# Patient Record
Sex: MALE | Race: WHITE | Employment: OTHER | URBAN - METROPOLITAN AREA
[De-identification: names, ages, dates, MRNs, and addresses within clinical notes are randomized per-mention and may not be internally consistent; named-entity substitution may affect disease eponyms.]

---

## 2018-05-24 LAB
CREATININE, EXTERNAL: 0.94
HBA1C MFR BLD HPLC: 8.4 %
LDL-C, EXTERNAL: 67

## 2018-06-22 ENCOUNTER — OFFICE VISIT (OUTPATIENT)
Dept: ENDOCRINOLOGY | Age: 73
End: 2018-06-22

## 2018-06-22 VITALS
OXYGEN SATURATION: 98 % | RESPIRATION RATE: 14 BRPM | HEART RATE: 64 BPM | WEIGHT: 117.2 LBS | SYSTOLIC BLOOD PRESSURE: 131 MMHG | DIASTOLIC BLOOD PRESSURE: 59 MMHG | HEIGHT: 64 IN | BODY MASS INDEX: 20.01 KG/M2 | TEMPERATURE: 96.4 F

## 2018-06-22 DIAGNOSIS — E78.2 MIXED HYPERLIPIDEMIA: ICD-10-CM

## 2018-06-22 DIAGNOSIS — I10 ESSENTIAL HYPERTENSION: ICD-10-CM

## 2018-06-22 DIAGNOSIS — E11.65 TYPE 2 DIABETES MELLITUS WITH HYPERGLYCEMIA, WITHOUT LONG-TERM CURRENT USE OF INSULIN (HCC): Primary | ICD-10-CM

## 2018-06-22 DIAGNOSIS — E83.52 HYPERCALCEMIA: ICD-10-CM

## 2018-06-22 RX ORDER — AMLODIPINE BESYLATE 5 MG/1
10 TABLET ORAL
COMMUNITY
End: 2021-11-11

## 2018-06-22 RX ORDER — LOSARTAN POTASSIUM AND HYDROCHLOROTHIAZIDE 25; 100 MG/1; MG/1
1 TABLET ORAL DAILY
COMMUNITY
End: 2019-07-24 | Stop reason: ALTCHOICE

## 2018-06-22 RX ORDER — METFORMIN HYDROCHLORIDE 500 MG/1
1000 TABLET, EXTENDED RELEASE ORAL 2 TIMES DAILY
COMMUNITY

## 2018-06-22 RX ORDER — ATORVASTATIN CALCIUM 10 MG/1
TABLET, FILM COATED ORAL
COMMUNITY

## 2018-06-22 RX ORDER — METOPROLOL SUCCINATE 25 MG/1
25 TABLET, EXTENDED RELEASE ORAL DAILY
COMMUNITY
End: 2020-06-19

## 2018-06-22 RX ORDER — ASPIRIN 81 MG/1
TABLET ORAL DAILY
COMMUNITY
End: 2019-11-04

## 2018-06-22 NOTE — PROGRESS NOTES
[facility]               Kenneth Teran MD        [address] 78 [phone] Fax [phone]               Patient Information  Date:6/24/2018  Name : Malcolm Leroy 75 y.o.     YOB: 1945         Referred by: Dimitri Riedel, MD         Chief Complaint   Patient presents with    New Patient     Diabetes       History of Present Illness: Malcolm Leroy is a 75 y.o. male here for initial visit of  Type 2 Diabetes Mellitus. He has long-standing history of type 2 diabetes mellitus, at least 20 years ago, on oral medications. He was on insulin several months ago which he has discontinued as he did not like. Last A1c was 8.5, he is on metformin and Januvia. Fastings are ranging from 140-160, no regular exercise  Trying to change the diet. No weight gain  He also has hypercalcemia with calcium more than 11, PTH nonsuppressed, DEXA scan showed osteopenia, no nephrolithiasis, no CKD,PUD  No polyuria, polydipsia, hypoglycemia, chest pain, shortness of breath  No tingling or numbness in the feet    Planning to resume exercise    Wt Readings from Last 3 Encounters:   06/22/18 117 lb 3.2 oz (53.2 kg)       BP Readings from Last 3 Encounters:   06/22/18 131/59           Past Medical History:   Diagnosis Date    DM (diabetes mellitus) ([address]     HTN (hypertension)     Hypercalcemia     Hyperlipidemia     Vitamin D deficiency      Current Outpatient Prescriptions   Medication Sig    losartan-hydroCHLOROthiazide (HYZAAR) 100-25 mg per tablet Take 1 Tab by mouth daily.  metoprolol succinate (TOPROL-XL) 25 mg XL tablet Take  by mouth daily.  metFORMIN ER (GLUCOPHAGE XR) 500 mg tablet Take 1,000 mg by mouth two (2) times a day.  atorvastatin (LIPITOR) 10 mg tablet Take  by mouth daily.  amLODIPine (NORVASC) 5 mg tablet Take 5 mg by mouth daily.  aspirin delayed-release 81 mg tablet Take  by mouth daily.     SITagliptin (Mardella Early) 100 mg tablet Take 100 mg by mouth daily. No current facility-administered medications for this visit. Allergies   Allergen Reactions    Aspirin Nausea Only       Review of Systems:  All systems reviewed and are negative other than mentioned in HPI    Physical Examination:   Blood pressure 131/59, pulse 64, temperature 96.4 °F (35.8 °C), temperature source Oral, resp. rate 14, height 5' 4\" (1.626 m), weight 117 lb 3.2 oz (53.2 kg), SpO2 98 %. Estimated body mass index is 20.12 kg/(m^2) as calculated from the following:    Height as of this encounter: 5' 4\" (1.626 m). -   Weight as of this encounter: 117 lb 3.2 oz (53.2 kg). - General: pleasant, no distress, good eye contact  - HEENT: no pallor, no periorbital edema, EOMI  - Neck: supple, no thyromegaly, no nodules  - Cardiovascular: regular,  normal S1 and S2, no murmurs  - Respiratory: clear to auscultation bilaterally  - Gastrointestinal: soft, nontender, nondistended,  BS +  - Musculoskeletal: no proximal muscle weakness in upper or lower extremities  - Integumentary: no acanthosis nigricans,no edema,   - Neurological: alert and oriented  - Psychiatric: normal mood and affect  - Skin: color, texture, turgor normal.     Diabetic foot exam: June 2019    Left Foot:   Visual Exam: normal    Pulse DP: 2+ (normal)   Filament test: reduced sensation          Right Foot:   Visual Exam: normal    Pulse DP: 2+ (normal)   Filament test: reduced sensation        Data Reviewed:     [] Glucose records reviewed. [] See glucose records for details (to be scanned). [] A1C  [] Reviewed labs    DEXA scan May 2018  Lumbar spine T score -1.2  Femoral neck T score -2.3  Right femoral neck T score -2.3  FRAX score major osteoporotic fractures is 5%, hip fracture 1.9%  No vertebral fractures reported    Assessment/Plan:     1. Type 2 diabetes mellitus with hyperglycemia, without long-term current use of insulin (HCC)        1.  Type 2 Diabetes Mellitus   No results found for: HBA1C, HGBE8, VLQ1WVPN, VAP6VZCA, BNY9BTVC  Uncontrolled  Discussed about decreasing pancreatic reserve over time, unless he is on very low carbohydrate diet and it is very difficult for him to control blood glucose, also stressed the importance of exercise  Continue metformin and Januvia  At this time he does not want any additional medications, he wants to try lifestyle changes, diet plan given    Diabetic issues reviewed : glycemic goals , written exchange diet given, low carbohydrate diet, weight control , home glucose monitoring emphasized,  hypoglycemia management and long term diabetic complications discussed. FLU annually ,Pneumovax ,aspirin daily,annual eye exam,microalbumin    2. HTN : Continue current therapy     3. Hyperlipidemia : Continue statin. 5.  Hypercalcemia: Nonsuppressed PTH    Primary hyperparathyroidism versus hydrochlorothiazide induced hypercalcemia  Need to discontinue hydrochlorothiazide and recheck labs in 6 weeks, hypercalcemia could be due to a combination of primary hyperparathyroidism and hydrochlorothiazide induced hypercalcemia, hydrochlorothiazide uncovers primary hyperparathyroidism. Wishes to consult PCP about discontinuing hydrochlorothiazide as he has been on it for a long time . No known osteoporosis, nephrolithiasis  There is a he can continue dairy intake  and there is no need for restriction   Hydration discussed      There are no Patient Instructions on file for this visit. Follow-up Disposition:  Return in about 4 months (around 10/22/2018). Thank you for allowing me to participate in the care of this patient. Briana Echols MD      Patient verbalized understanding     Voice-recognition software was used to generate this report, which may result in some phonetic-based errors in the grammar and contents. Even though attempts were made to correct all the mistakes, some may have been missed and remained in the body of the report.

## 2018-06-22 NOTE — MR AVS SNAPSHOT
[address] [address] 
[phone] Patient: Evi Ryan MRN: [id number] KKK:[id number] Visit Information Date & Time Provider Department Dept. Phone Encounter #  
 6/22/2018  9:00 AM Breanne Morales MD [facility] Diabetes & Endocrinology [phone] [id number] Follow-up Instructions Return in about 4 months (around 10/22/2018). Upcoming Health Maintenance Date Due DTaP/Tdap/Td series (1 - Tdap) 6/1/1965 FOBT Q 1 YEAR AGE 50-75 6/1/1994 ZOSTER VACCINE AGE 60> 4/1/2004 GLAUCOMA SCREENING Q2Y 6/1/2009 Pneumococcal 65+ Low/Medium Risk (1 of 2 - PCV13) 6/1/2009 Influenza Age 5 to Adult 8/1/2018 Allergies as of 6/22/2018  Review Complete On: 6/22/2018 By: Breanne Morales MD  
  
 Severity Noted Reaction Type Reactions Aspirin  06/22/2018    Nausea Only Current Immunizations  Never Reviewed No immunizations on file. Not reviewed this visit You Were Diagnosed With   
  
 Codes Comments Type 2 diabetes mellitus with hyperglycemia, without long-term current use of insulin (HCC)    -  Primary ICD-10-CM: E11.65 ICD-9-CM: 250.00, 790.29 Vitals BP Pulse Temp Resp Height(growth percentile) Weight(growth percentile) 131/59 (BP 1 Location: Left arm, BP Patient Position: Sitting) 64 96.4 °F (35.8 °C) (Oral) 14 5' 4\" (1.626 m) 117 lb 3.2 oz (53.2 kg) SpO2 BMI Smoking Status 98% 20.12 kg/m2 Never Smoker Vitals History BMI and BSA Data Body Mass Index Body Surface Area  
 20.12 kg/m 2 1.55 m 2 Your Updated Medication List  
  
   
This list is accurate as of 6/22/18  9:57 AM.  Always use your most recent med list. amLODIPine 5 mg tablet Commonly known as:  Caralee Dupes Take 5 mg by mouth daily. aspirin delayed-release 81 mg tablet Take  by mouth daily. atorvastatin 10 mg tablet Commonly known as:  LIPITOR Take  by mouth daily. JANUVIA 100 mg tablet Generic drug:  SITagliptin Take 100 mg by mouth daily. losartan-hydroCHLOROthiazide 100-25 mg per tablet Commonly known as:  HYZAAR Take 1 Tab by mouth daily. metFORMIN  mg tablet Commonly known as:  GLUCOPHAGE XR Take 1,000 mg by mouth two (2) times a day. metoprolol succinate 25 mg XL tablet Commonly known as:  TOPROL-XL Take  by mouth daily. Follow-up Instructions Return in about 4 months (around 10/22/2018). Introducing \[facility]\"" & HEALTH SERVICES! Ana Lui introduces [facility] patient portal. Now you can access parts of your medical record, email your doctor's office, and request medication refills online. 1. In your internet browser, go to [email or web address] 2. Click on the First Time User? Click Here link in the Sign In box. You will see the New Member Sign Up page. 3. Enter your [facility] Access Code exactly as it appears below. You will not need to use this code after youve completed the sign-up process. If you do not sign up before the expiration date, you must request a new code. · [facility] Access Code: [email or web address] Expires: 9/20/2018  9:12 AM 
 
4. Enter the last four digits of your Social Security Number (xxxx) and Date of Birth (mm/dd/yyyy) as indicated and click Submit. You will be taken to the next sign-up page. 5. Create a [facility] ID. This will be your [facility] login ID and cannot be changed, so think of one that is secure and easy to remember. 6. Create a [facility] password. You can change your password at any time. 7. Enter your Password Reset Question and Answer. This can be used at a later time if you forget your password. 8. Enter your e-mail address. You will receive e-mail notification when new information is available in [address] 9. Click Sign Up. You can now view and download portions of your medical record. 10. Click the Download Summary menu link to download a portable copy of your medical information. If you have questions, please visit the Frequently Asked Questions section of the [employer] website. Remember, [employer] is NOT to be used for urgent needs. For medical emergencies, dial 911. Now available from your iPhone and Android! Please provide this summary of care documentation to your next provider. Your primary care clinician is listed as Claudell Coral. If you have any questions after today's visit, please call [phone].

## 2018-06-22 NOTE — PROGRESS NOTES
Evi Ryan is a 75 y.o. male here for   Chief Complaint   Patient presents with    New Patient     Diabetes       Functional glucose monitor and record keeping system? - yes [employer] carlos  Eye exam within last year? -within  last year  Foot exam within last year? - due today    1. Have you been to the ER, urgent care clinic since your last visit? Hospitalized since your last visit? - no    2. Have you seen or consulted any other health care providers outside of the [address] [place] [address] since your last visit?   Include any pap smears or colon screening.- PCP

## 2018-06-24 PROBLEM — E78.2 MIXED HYPERLIPIDEMIA: Status: ACTIVE | Noted: 2018-06-24

## 2018-06-24 PROBLEM — I10 ESSENTIAL HYPERTENSION: Status: ACTIVE | Noted: 2018-06-24

## 2018-06-24 PROBLEM — E11.65 TYPE 2 DIABETES MELLITUS WITH HYPERGLYCEMIA, WITHOUT LONG-TERM CURRENT USE OF INSULIN (HCC): Status: ACTIVE | Noted: 2018-06-24

## 2018-06-24 PROBLEM — E83.52 HYPERCALCEMIA: Status: ACTIVE | Noted: 2018-06-24

## 2018-06-27 ENCOUNTER — TELEPHONE (OUTPATIENT)
Dept: ENDOCRINOLOGY | Age: 73
End: 2018-06-27

## 2018-06-27 NOTE — TELEPHONE ENCOUNTER
Informed pt of results below.  Pt verbalized understanding.     ----- Message from Myah Mcleod MD sent at 6/26/2018  5:41 PM EDT -----  Making insulin on his own , if he exercise and cut the portions sugars can be controlled well

## 2018-10-24 ENCOUNTER — OFFICE VISIT (OUTPATIENT)
Dept: ENDOCRINOLOGY | Age: 73
End: 2018-10-24

## 2018-10-24 VITALS
WEIGHT: 115.4 LBS | OXYGEN SATURATION: 100 % | BODY MASS INDEX: 19.7 KG/M2 | SYSTOLIC BLOOD PRESSURE: 151 MMHG | HEART RATE: 63 BPM | TEMPERATURE: 96.4 F | HEIGHT: 64 IN | RESPIRATION RATE: 14 BRPM | DIASTOLIC BLOOD PRESSURE: 56 MMHG

## 2018-10-24 DIAGNOSIS — E78.2 MIXED HYPERLIPIDEMIA: ICD-10-CM

## 2018-10-24 DIAGNOSIS — E11.65 TYPE 2 DIABETES MELLITUS WITH HYPERGLYCEMIA, WITHOUT LONG-TERM CURRENT USE OF INSULIN (HCC): Primary | ICD-10-CM

## 2018-10-24 DIAGNOSIS — E83.52 HYPERCALCEMIA: ICD-10-CM

## 2018-10-24 DIAGNOSIS — I10 ESSENTIAL HYPERTENSION: ICD-10-CM

## 2018-10-24 NOTE — PROGRESS NOTES
Beltran Hsu is a 75 y.o. male here for Chief Complaint Patient presents with  Diabetes Functional glucose monitor and record keeping system? - yes Eye exam within last year? - on file Foot exam within last year? - on file 1. Have you been to the ER, urgent care clinic since your last visit? Hospitalized since your last visit? - no 
 
2. Have you seen or consulted any other health care providers outside of the [address] [place] [address] since your last visit?   Include any pap smears or colon screening.- PCP

## 2018-10-24 NOTE — PROGRESS NOTES
[facility] DIABETES AND ENDOCRINOLOGY Emperatriz Salmon MD 
 
    [address] [phone] Fax [phone] Patient Information Date:10/24/2018 Name : Beltran Hsu 75 y.o.    
YOB: 1945 Referred by: Светлана Aranda MD  
 
 
 
Chief Complaint Patient presents with  Diabetes History of Present Illness: Beltran Hsu is a 75 y.o. male here for initial visit of  Type 2 Diabetes Mellitus. He has long-standing history of type 2 diabetes mellitus, at least 20 years ago, on oral medications. He was on insulin several months ago which he has discontinued as he did not like. Last A1c was 8.5, he is on metformin and Januvia. Fastings are ranging from 140-160, no regular exercise Trying to change the diet. No weight gain He also has hypercalcemia with calcium more than 11, PTH nonsuppressed, DEXA scan showed osteopenia, no nephrolithiasis, no CKD,PUD No polyuria, polydipsia, hypoglycemia, chest pain, shortness of breath No tingling or numbness in the feet Planning to resume exercise Wt Readings from Last 3 Encounters:  
10/24/18 115 lb 6.4 oz (52.3 kg) 06/22/18 117 lb 3.2 oz (53.2 kg) BP Readings from Last 3 Encounters:  
10/24/18 151/56  
06/22/18 131/59 Past Medical History:  
Diagnosis Date  DM [facility]diabetes mellitus) [facility]  HTN (hypertension)  Hypercalcemia  Hyperlipidemia  Vitamin D deficiency Current Outpatient Medications Medication Sig  
 losartan-hydroCHLOROthiazide (HYZAAR) 100-25 mg per tablet Take 1 Tab by mouth daily.  metoprolol succinate (TOPROL-XL) 25 mg XL tablet Take 12.5 mg by mouth daily.  metFORMIN ER (GLUCOPHAGE XR) 500 mg tablet Take 1,000 mg by mouth two (2) times a day.  atorvastatin (LIPITOR) 10 mg tablet Take  by mouth daily.  amLODIPine (NORVASC) 5 mg tablet Take 5 mg by mouth daily.  aspirin delayed-release 81 mg tablet Take  by mouth daily.  SITagliptin (JANUVIA) 100 mg tablet Take 100 mg by mouth daily. No current facility-administered medications for this visit. Allergies Allergen Reactions  Aspirin Nausea Only Review of Systems:  All systems reviewed and are negative other than mentioned in HPI Physical Examination: 
 Blood pressure 151/56, pulse 63, temperature 96.4 °F (35.8 °C), temperature source Oral, resp. rate 14, height 5' 4\" (1.626 m), weight 115 lb 6.4 oz (52.3 kg), SpO2 100 %. Estimated body mass index is 19.81 kg/m² as calculated from the following: 
  Height as of this encounter: 5' 4\" (1.626 m). -   Weight as of this encounter: 115 lb 6.4 oz (52.3 kg). - General: pleasant, no distress, good eye contact 
- HEENT: no pallor, no periorbital edema, EOMI 
- Neck: supple, no thyromegaly, no nodules - Cardiovascular: regular,  normal S1 and S2, no murmurs - Respiratory: clear to auscultation bilaterally - Gastrointestinal: soft, nontender, nondistended,  BS + 
- Musculoskeletal: no proximal muscle weakness in upper or lower extremities - Integumentary: no acanthosis nigricans,no edema,  
- Neurological: alert and oriented - Psychiatric: normal mood and affect 
- Skin: color, texture, turgor normal.  
 
Diabetic foot exam: June 2019 Left Foot: 
 Visual Exam: normal  
 Pulse DP: 2+ (normal) Filament test: reduced sensation Right Foot: 
 Visual Exam: normal  
 Pulse DP: 2+ (normal) Filament test: reduced sensation Data Reviewed:  
 
[] Glucose records reviewed. [] See glucose records for details (to be scanned). [] A1C [] Reviewed labs DEXA scan May 2018 Lumbar spine T score -1.2 Femoral neck T score -2.3 Right femoral neck T score -2.3 FRAX score major osteoporotic fractures is 5%, hip fracture 1.9% No vertebral fractures reported Assessment/Plan: 1. Type 2 diabetes mellitus with hyperglycemia, without long-term current use of insulin ([address] 2. Mixed hyperlipidemia 3. Essential hypertension 4. Hypercalcemia 1. Type 2 Diabetes Mellitus Lab Results Component Value Date/Time Hemoglobin A1c, External 8.4 05/24/2018 A1c is improved to 7.6 Low pancreatic reserve Continue metformin and Januvia Increase protein intake FLU annually ,Pneumovax ,aspirin daily,annual eye exam,microalbumin 2. HTN : Continue current therapy 3. Hyperlipidemia : Continue statin. 5.  Hypercalcemia: Nonsuppressed PTH Primary hyperparathyroidism versus hydrochlorothiazide induced hypercalcemia 24-hour urine calcium along with creatinine Vitamin D is normal 
Osteopenia No restriction on dairy intake There are no Patient Instructions on file for this visit. Follow-up Disposition: Not on File Thank you for allowing me to participate in the care of this patient. Joy Storm MD 
 
 
Patient verbalized understanding Voice-recognition software was used to generate this report, which may result in some phonetic-based errors in the grammar and contents. Even though attempts were made to correct all the mistakes, some may have been missed and remained in the body of the report.

## 2018-10-24 NOTE — PATIENT INSTRUCTIONS
24 hour urine collection instructions On the day you plan to collect urine 1. Discard first urine sample soon after you get up 2. Start collecting urine samples in the container then on whole first day . Mario New ... 3. until the first sample next day is collected into the jar.

## 2019-02-08 LAB
HBA1C MFR BLD HPLC: 8.4 %
MICROALBUMIN UR TEST STR-MCNC: 8 MG/DL

## 2019-03-07 LAB
CREATININE, EXTERNAL: 0.79
LDL-C, EXTERNAL: 74

## 2019-03-13 DIAGNOSIS — E83.52 HYPERCALCEMIA: ICD-10-CM

## 2019-03-13 DIAGNOSIS — I10 ESSENTIAL HYPERTENSION: ICD-10-CM

## 2019-03-13 DIAGNOSIS — E78.2 MIXED HYPERLIPIDEMIA: ICD-10-CM

## 2019-03-13 DIAGNOSIS — E11.65 TYPE 2 DIABETES MELLITUS WITH HYPERGLYCEMIA, WITHOUT LONG-TERM CURRENT USE OF INSULIN (HCC): ICD-10-CM

## 2019-03-14 LAB
CALCIUM 24H UR-MCNC: 9.3 MG/DL
CALCIUM 24H UR-MRATE: 279 MG/24 HR (ref 100–300)
CREAT 24H UR-MRATE: 777 MG/24 HR (ref 1000–2000)
CREAT UR-MCNC: 25.9 MG/DL

## 2019-03-26 ENCOUNTER — OFFICE VISIT (OUTPATIENT)
Dept: ENDOCRINOLOGY | Age: 74
End: 2019-03-26

## 2019-03-26 VITALS
DIASTOLIC BLOOD PRESSURE: 59 MMHG | RESPIRATION RATE: 16 BRPM | OXYGEN SATURATION: 99 % | HEART RATE: 58 BPM | BODY MASS INDEX: 20.32 KG/M2 | WEIGHT: 119 LBS | HEIGHT: 64 IN | SYSTOLIC BLOOD PRESSURE: 157 MMHG

## 2019-03-26 DIAGNOSIS — E83.52 HYPERCALCEMIA: ICD-10-CM

## 2019-03-26 DIAGNOSIS — I10 ESSENTIAL HYPERTENSION: ICD-10-CM

## 2019-03-26 DIAGNOSIS — E11.65 TYPE 2 DIABETES MELLITUS WITH HYPERGLYCEMIA, WITHOUT LONG-TERM CURRENT USE OF INSULIN (HCC): Primary | ICD-10-CM

## 2019-03-26 DIAGNOSIS — E78.2 MIXED HYPERLIPIDEMIA: ICD-10-CM

## 2019-03-26 RX ORDER — ERGOCALCIFEROL 1.25 MG/1
50000 CAPSULE ORAL
COMMUNITY
End: 2019-11-04

## 2019-03-26 RX ORDER — LOSARTAN POTASSIUM 100 MG/1
50 TABLET ORAL
COMMUNITY
End: 2021-11-11

## 2019-03-26 NOTE — PROGRESS NOTES
[facility] DIABETES AND ENDOCRINOLOGY Sonya Bermudez MD 
 
    [address] [phone] Fax [phone] Patient Information Date:3/26/2019 Name : Dede Boone 75 y.o.    
YOB: 1945 Referred by: Eduarda Carl MD  
 
 
 
Chief Complaint Patient presents with  Diabetes  Elevated Blood Calcium History of Present Illness: Dede Boone is a 75 y.o. male here for follow-up of  Type 2 Diabetes Mellitus. He has long-standing history of type 2 diabetes mellitus, at least 20 years ago, on oral medications. He was on insulin before which he has discontinued as he did not like. He is checking blood glucose twice daily, exercising. In January as well as December his diet was unhealthy according to the wife. His activity also was decreased. He also has hypercalcemia with calcium more than 11, PTH nonsuppressed, DEXA scan showed osteopenia, no nephrolithiasis, no CKD,PUD No polyuria, polydipsia, hypoglycemia, chest pain, shortness of breath No tingling or numbness in the feet Planning to resume exercise Wt Readings from Last 3 Encounters:  
03/26/19 119 lb (54 kg) 10/24/18 115 lb 6.4 oz (52.3 kg) 06/22/18 117 lb 3.2 oz (53.2 kg) BP Readings from Last 3 Encounters:  
03/26/19 157/59  
10/24/18 151/56  
06/22/18 131/59 Past Medical History:  
Diagnosis Date  DM (diabetes mellitus) ([facility]  HTN (hypertension)  Hypercalcemia  Hyperlipidemia  Vitamin D deficiency Current Outpatient Medications Medication Sig  ergocalciferol (VITAMIN D2) 50,000 unit capsule Take 50,000 Units by mouth. Twice a week  losartan (COZAAR) 100 mg tablet Take 100 mg by mouth daily.  metoprolol succinate (TOPROL-XL) 25 mg XL tablet Take 25 mg by mouth daily.  metFORMIN ER (GLUCOPHAGE XR) 500 mg tablet Take 1,000 mg by mouth two (2) times a day.  atorvastatin (LIPITOR) 10 mg tablet Take  by mouth daily.  amLODIPine (NORVASC) 5 mg tablet Take 5 mg by mouth daily.  SITagliptin (JANUVIA) 100 mg tablet Take 100 mg by mouth daily.  losartan-hydroCHLOROthiazide (HYZAAR) 100-25 mg per tablet Take 1 Tab by mouth daily.  aspirin delayed-release 81 mg tablet Take  by mouth daily. No current facility-administered medications for this visit. Allergies Allergen Reactions  Aspirin Nausea Only Review of Systems:  All systems reviewed and are negative other than mentioned in HPI Physical Examination: 
 Blood pressure 157/59, pulse (!) 58, resp. rate 16, height 5' 4\" (1.626 m), weight 119 lb (54 kg), SpO2 99 %. Estimated body mass index is 20.43 kg/m² as calculated from the following: 
  Height as of this encounter: 5' 4\" (1.626 m). -   Weight as of this encounter: 119 lb (54 kg). - General: pleasant, no distress, good eye contact 
- HEENT: no pallor, no periorbital edema, EOMI 
- Neck: supple, no thyromegaly, no nodules - Cardiovascular: regular,  normal S1 and S2, no murmurs - Respiratory: clear to auscultation bilaterally - Gastrointestinal: soft, nontender, nondistended,  BS + 
- Musculoskeletal: no proximal muscle weakness in upper or lower extremities - Integumentary: no acanthosis nigricans,no edema,  
- Neurological: alert and oriented - Psychiatric: normal mood and affect 
- Skin: color, texture, turgor normal.  
 
Diabetic foot exam: June 2019 Left Foot: 
 Visual Exam: normal  
 Pulse DP: 2+ (normal) Filament test: reduced sensation Right Foot: 
 Visual Exam: normal  
 Pulse DP: 2+ (normal) Filament test: reduced sensation Data Reviewed: DEXA scan May 2018 Lumbar spine T score -1.2 Femoral neck T score -2.3 Right femoral neck T score -2.3 FRAX score major osteoporotic fractures is 5%, hip fracture 1.9% No vertebral fractures reported Assessment/Plan: 1. Type 2 diabetes mellitus with hyperglycemia, without long-term current use of insulin ([address] 2. Essential hypertension 3. Mixed hyperlipidemia 4. Hypercalcemia 1. Type 2 Diabetes Mellitus Lab Results Component Value Date/Time Hemoglobin A1c, External 7.6 08/03/2018 Worsening of the A1c due to dietary indiscretion Low pancreatic reserve Continue metformin and Januvia Increase protein intake Reviewed the last 1 month blood glucose log which has improved FLU annually ,Pneumovax ,aspirin daily,annual eye exam,microalbumin 2. HTN : Continue current therapy 3. Hyperlipidemia : Continue statin. 5.  Hypercalcemia: Nonsuppressed PTH Primary hyperparathyroidism 24-hour urine calcium 270 PTH 40 with calcium more than 11 Osteopenia No restriction on dairy intake His calcium is persistently being more than 11, discussed surgical options, risk of worsening bone density. He wants to think over There are no Patient Instructions on file for this visit. Thank you for allowing me to participate in the care of this patient. Willie Bran MD 
 
 
Patient verbalized understanding Voice-recognition software was used to generate this report, which may result in some phonetic-based errors in the grammar and contents. Even though attempts were made to correct all the mistakes, some may have been missed and remained in the body of the report.

## 2019-03-26 NOTE — PROGRESS NOTES
Suyapa Grace is a 75 y.o. male here for Chief Complaint Patient presents with  Diabetes  Elevated Blood Calcium Functional glucose monitor and record keeping system? -yes Eye exam within last year? - on file Foot exam within last year? - on file 1. Have you been to the ER, urgent care clinic since your last visit? Hospitalized since your last visit? -no 
 
2. Have you seen or consulted any other health care providers outside of the [address] [place] [address] since your last visit? Include any pap smears or colon screening. -PCP

## 2019-03-26 NOTE — LETTER
3/28/19 Patient: Medina Knapp YOB: 1945 Date of Visit: 3/26/2019 Carlos Alberto Olivas MD 
[address] [facility] [address] VIA Facsimile: [phone] Dear Carlos Alberto Olivas MD, Thank you for referring Mr. Kunal Baca to [address] [place] [address] for evaluation. My notes for this consultation are attached. If you have questions, please do not hesitate to call me. I look forward to following your patient along with you. Sincerely, Kati Grace MD

## 2019-03-27 LAB — PTH-INTACT SERPL-MCNC: 40 PG/ML (ref 15–65)

## 2019-07-24 ENCOUNTER — OFFICE VISIT (OUTPATIENT)
Dept: ENDOCRINOLOGY | Age: 74
End: 2019-07-24

## 2019-07-24 VITALS
TEMPERATURE: 97 F | WEIGHT: 119 LBS | BODY MASS INDEX: 20.32 KG/M2 | SYSTOLIC BLOOD PRESSURE: 136 MMHG | RESPIRATION RATE: 14 BRPM | HEIGHT: 64 IN | HEART RATE: 69 BPM | DIASTOLIC BLOOD PRESSURE: 66 MMHG

## 2019-07-24 DIAGNOSIS — E78.2 MIXED HYPERLIPIDEMIA: ICD-10-CM

## 2019-07-24 DIAGNOSIS — E11.65 TYPE 2 DIABETES MELLITUS WITH HYPERGLYCEMIA, WITHOUT LONG-TERM CURRENT USE OF INSULIN (HCC): Primary | ICD-10-CM

## 2019-07-24 DIAGNOSIS — E21.0 PRIMARY HYPERPARATHYROIDISM (HCC): ICD-10-CM

## 2019-07-24 LAB — HBA1C MFR BLD HPLC: 7.7 %

## 2019-07-24 NOTE — PROGRESS NOTES
Wt Readings from Last 3 Encounters:   07/24/19 119 lb (54 kg)   03/26/19 119 lb (54 kg)   10/24/18 115 lb 6.4 oz (52.3 kg)     Temp Readings from Last 3 Encounters:   07/24/19 97 °F (36.1 °C) (Oral)   10/24/18 96.4 °F (35.8 °C) (Oral)   06/22/18 96.4 °F (35.8 °C) (Oral)     BP Readings from Last 3 Encounters:   07/24/19 136/66   03/26/19 157/59   10/24/18 151/56     Pulse Readings from Last 3 Encounters:   07/24/19 69   03/26/19 (!) 58   10/24/18 63     Lab Results   Component Value Date/Time    Hemoglobin A1c, External 8.4 02/08/2019

## 2019-07-24 NOTE — LETTER
7/26/19 Patient: Valrie Gitelman YOB: 1945 Date of Visit: 7/24/2019 Mark Ventura MD 
[address] [facility] [address] VIA Facsimile: [phone] Dear Mark Ventura MD, Thank you for referring Mr. Álvaro Wong to [address] [place] [address] for evaluation. My notes for this consultation are attached. If you have questions, please do not hesitate to call me. I look forward to following your patient along with you. Sincerely, Elsa Self MD

## 2019-07-24 NOTE — PROGRESS NOTES
[facility]               Britni Stanley MD        [address] 78 [phone] Fax [phone]               Patient Information  Date:7/26/2019  Name : Aravind Mo 75 y.o.     YOB: 1945         Referred by: Gabrielle Hamman, MD         Chief Complaint   Patient presents with    Diabetes    Elevated Blood Calcium       History of Present Illness: Aravind Mo is a 75 y.o. male here for follow-up of  Type 2 Diabetes Mellitus. He has long-standing history of type 2 diabetes mellitus, at least 20 years ago, on oral medications. He was on insulin before which he has discontinued as he did not like. He is checking blood glucose twice daily, exercising. Weight has been stable  He is trying very hard to control blood glucose  Diet is healthy    He also has hypercalcemia with calcium more than 11, PTH nonsuppressed, DEXA scan showed osteopenia, no nephrolithiasis, no CKD,PUD  No polyuria, polydipsia, hypoglycemia, chest pain, shortness of breath  No tingling or numbness in the feet    Planning to resume exercise    Wt Readings from Last 3 Encounters:   07/24/19 119 lb (54 kg)   03/26/19 119 lb (54 kg)   10/24/18 115 lb 6.4 oz (52.3 kg)       BP Readings from Last 3 Encounters:   07/24/19 136/66   03/26/19 157/59   10/24/18 151/56           Past Medical History:   Diagnosis Date    DM (diabetes mellitus) ([facility]     HTN (hypertension)     Hypercalcemia     Hyperlipidemia     Vitamin D deficiency      Current Outpatient Medications   Medication Sig    losartan (COZAAR) 100 mg tablet Take 100 mg by mouth daily.  metoprolol succinate (TOPROL-XL) 25 mg XL tablet Take 25 mg by mouth daily.  metFORMIN ER (GLUCOPHAGE XR) 500 mg tablet Take 1,000 mg by mouth two (2) times a day.  atorvastatin (LIPITOR) 10 mg tablet Take  by mouth daily.  amLODIPine (NORVASC) 5 mg tablet Take 5 mg by mouth daily.     SITagliptin (JANUVIA) 100 mg tablet Take 100 mg by mouth daily.  ergocalciferol (VITAMIN D2) 50,000 unit capsule Take 50,000 Units by mouth. Twice a week    aspirin delayed-release 81 mg tablet Take  by mouth daily. No current facility-administered medications for this visit. Allergies   Allergen Reactions    Aspirin Nausea Only       Review of Systems:  All systems reviewed and are negative other than mentioned in HPI    Physical Examination:   Blood pressure 136/66, pulse 69, temperature 97 °F (36.1 °C), temperature source Oral, resp. rate 14, height 5' 4\" (1.626 m), weight 119 lb (54 kg). Estimated body mass index is 20.43 kg/m² as calculated from the following:    Height as of this encounter: 5' 4\" (1.626 m). -   Weight as of this encounter: 119 lb (54 kg). - General: pleasant, no distress, good eye contact  - HEENT: no pallor, no periorbital edema, EOMI  - Neck: supple, no thyromegaly, no nodules  - Cardiovascular: regular,  normal S1 and S2, no murmurs  - Respiratory: clear to auscultation bilaterally  - Gastrointestinal: soft, nontender, nondistended,  BS +  - Musculoskeletal: no proximal muscle weakness in upper or lower extremities  - Integumentary: no acanthosis nigricans,no edema,   - Neurological: alert and oriented  - Psychiatric: normal mood and affect  - Skin: color, texture, turgor normal.     Diabetic foot exam: June 2019    Left Foot:   Visual Exam: normal    Pulse DP: 2+ (normal)   Filament test: reduced sensation          Right Foot:   Visual Exam: normal    Pulse DP: 2+ (normal)   Filament test: reduced sensation        Data Reviewed:         DEXA scan May 2018  Lumbar spine T score -1.2  Femoral neck T score -2.3  Right femoral neck T score -2.3  FRAX score major osteoporotic fractures is 5%, hip fracture 1.9%  No vertebral fractures reported    Assessment/Plan:     1. Type 2 diabetes mellitus with hyperglycemia, without long-term current use of insulin ([facility])        1.  Type 2 Diabetes Mellitus   Lab Results Component Value Date/Time    Hemoglobin A1c (POC) 7.7 07/24/2019 02:06 PM    Hemoglobin A1c, External 8.4 02/08/2019     Improved glycemic control  Low pancreatic reserve  Continue metformin and Januvia  Increase protein intake       FLU annually ,Pneumovax ,aspirin daily,annual eye exam,microalbumin    2. HTN : Continue current therapy     3. Hyperlipidemia : Continue statin. 5.  Hypercalcemia: Nonsuppressed PTH    Primary hyperparathyroidism   24-hour urine calcium 270  PTH 40 with calcium more than 11  Osteopenia  No restriction on dairy intake  His calcium is persistently being more than 11, discussed surgical options, risk of worsening bone density. There are no Patient Instructions on file for this visit. Follow-up and Dispositions    · Return in about 4 months (around 11/24/2019). Thank you for allowing me to participate in the care of this patient. Julissa France MD      Patient verbalized understanding     Voice-recognition software was used to generate this report, which may result in some phonetic-based errors in the grammar and contents. Even though attempts were made to correct all the mistakes, some may have been missed and remained in the body of the report.

## 2019-08-10 LAB
CREATININE, EXTERNAL: 0.83
HBA1C MFR BLD HPLC: 8.2 %
LDL-C, EXTERNAL: 68

## 2019-11-04 ENCOUNTER — OFFICE VISIT (OUTPATIENT)
Dept: ENDOCRINOLOGY | Age: 74
End: 2019-11-04

## 2019-11-04 VITALS
BODY MASS INDEX: 20.32 KG/M2 | TEMPERATURE: 95.6 F | DIASTOLIC BLOOD PRESSURE: 61 MMHG | HEIGHT: 64 IN | HEART RATE: 59 BPM | OXYGEN SATURATION: 99 % | RESPIRATION RATE: 16 BRPM | SYSTOLIC BLOOD PRESSURE: 151 MMHG | WEIGHT: 119 LBS

## 2019-11-04 DIAGNOSIS — E21.0 PRIMARY HYPERPARATHYROIDISM (HCC): ICD-10-CM

## 2019-11-04 DIAGNOSIS — E11.65 TYPE 2 DIABETES MELLITUS WITH HYPERGLYCEMIA, WITHOUT LONG-TERM CURRENT USE OF INSULIN (HCC): Primary | ICD-10-CM

## 2019-11-04 DIAGNOSIS — I10 ESSENTIAL HYPERTENSION: ICD-10-CM

## 2019-11-04 DIAGNOSIS — E78.2 MIXED HYPERLIPIDEMIA: ICD-10-CM

## 2019-11-04 DIAGNOSIS — E11.65 TYPE 2 DIABETES MELLITUS WITH HYPERGLYCEMIA, WITHOUT LONG-TERM CURRENT USE OF INSULIN (HCC): ICD-10-CM

## 2019-11-04 RX ORDER — REPAGLINIDE 2 MG/1
2 TABLET ORAL
Qty: 90 TAB | Refills: 3 | Status: SHIPPED | OUTPATIENT
Start: 2019-11-04 | End: 2019-11-08 | Stop reason: SDUPTHER

## 2019-11-04 RX ORDER — REPAGLINIDE 2 MG/1
2 TABLET ORAL
Qty: 90 TAB | Refills: 3 | Status: SHIPPED | OUTPATIENT
Start: 2019-11-04 | End: 2019-11-04 | Stop reason: SDUPTHER

## 2019-11-04 NOTE — LETTER
11/5/19 Patient: Raheel Christensen YOB: 1945 Date of Visit: 11/4/2019 Izaiah Walker MD 
[address] [facility] [address] VIA Facsimile: [phone] Dear Izaiah Walker MD, Thank you for referring Mr. Meir Segovia to [address] [place] [address] for evaluation. My notes for this consultation are attached. If you have questions, please do not hesitate to call me. I look forward to following your patient along with you. Sincerely, Taina Kelly MD

## 2019-11-04 NOTE — PROGRESS NOTES
[facility]               Velia Tenorio MD        [address] 78 [phone] Fax [phone]               Patient Information  Date:11/4/2019  Name : Alton Llanes 75 y.o.     YOB: 1945         Referred by: Raymond Welsh MD         Chief Complaint   Patient presents with    Diabetes       History of Present Illness: Alton Llanes is a 75 y.o. male here for follow-up of  Type 2 Diabetes Mellitus. He has long-standing history of type 2 diabetes mellitus, at least 20 years ago, on oral medications. He was on insulin before which he has discontinued as he did not like. He is checking blood glucose twice daily, exercising. Did not bring the meter  He had labs at PCPs office, calcium 10.8, A1c 8.2  He is trying very hard to control blood glucose  Diet is healthy    He also has hypercalcemia with calcium more than 11, PTH nonsuppressed, DEXA scan showed osteopenia, no nephrolithiasis, no CKD,PUD  No polyuria, polydipsia, hypoglycemia, chest pain, shortness of breath  No tingling or numbness in the feet    Planning to resume exercise    Wt Readings from Last 3 Encounters:   11/04/19 119 lb (54 kg)   07/24/19 119 lb (54 kg)   03/26/19 119 lb (54 kg)       BP Readings from Last 3 Encounters:   11/04/19 151/61   07/24/19 136/66   03/26/19 157/59           Past Medical History:   Diagnosis Date    DM [facility]diabetes mellitus) ([facility]     HTN (hypertension)     Hypercalcemia     Hyperlipidemia     Vitamin D deficiency      Current Outpatient Medications   Medication Sig    losartan (COZAAR) 100 mg tablet Take 100 mg by mouth daily.  metoprolol succinate (TOPROL-XL) 25 mg XL tablet Take 25 mg by mouth daily.  metFORMIN ER (GLUCOPHAGE XR) 500 mg tablet Take 1,000 mg by mouth two (2) times a day.  atorvastatin (LIPITOR) 10 mg tablet Take  by mouth daily.  amLODIPine (NORVASC) 5 mg tablet Take 5 mg by mouth daily.     SITagliptin (JANUVIA) 100 mg tablet Take 100 mg by mouth daily.  ergocalciferol (VITAMIN D2) 50,000 unit capsule Take 50,000 Units by mouth. Twice a week    aspirin delayed-release 81 mg tablet Take  by mouth daily. No current facility-administered medications for this visit. Allergies   Allergen Reactions    Aspirin Nausea Only       Review of Systems:  All systems reviewed and are negative other than mentioned in HPI    Physical Examination:   Blood pressure 151/61, pulse (!) 59, temperature 95.6 °F (35.3 °C), temperature source Oral, resp. rate 16, height 5' 4\" (1.626 m), weight 119 lb (54 kg), SpO2 99 %. Estimated body mass index is 20.43 kg/m² as calculated from the following:    Height as of this encounter: 5' 4\" (1.626 m). -   Weight as of this encounter: 119 lb (54 kg). - General: pleasant, no distress, good eye contact  - HEENT: no pallor, no periorbital edema, EOMI  - Neck: supple, no thyromegaly, no nodules  - Cardiovascular: regular,  normal S1 and S2, no murmurs  - Respiratory: clear to auscultation bilaterally  - Gastrointestinal: soft, nontender, nondistended,  BS +  - Musculoskeletal: no proximal muscle weakness in upper or lower extremities  - Integumentary: no acanthosis nigricans,no edema,   - Neurological: alert and oriented  - Psychiatric: normal mood and affect  - Skin: color, texture, turgor normal.     Diabetic foot exam: June 2019    Left Foot:   Visual Exam: normal    Pulse DP: 2+ (normal)   Filament test: reduced sensation          Right Foot:   Visual Exam: normal    Pulse DP: 2+ (normal)   Filament test: reduced sensation        Data Reviewed:         DEXA scan May 2018  Lumbar spine T score -1.2  Femoral neck T score -2.3  Right femoral neck T score -2.3  FRAX score major osteoporotic fractures is 5%, hip fracture 1.9%  No vertebral fractures reported    Assessment/Plan:     1. Type 2 diabetes mellitus with hyperglycemia, without long-term current use of insulin ([facility]    2. Essential hypertension    3. Mixed hyperlipidemia        1. Type 2 Diabetes Mellitus   Lab Results   Component Value Date/Time    Hemoglobin A1c (POC) 7.7 07/24/2019 02:06 PM    Hemoglobin A1c, External 8.4 02/08/2019   A1c September 2019 8.2  A1c goal 7.5-8  Low pancreatic reserve  Continue metformin and Januvia  , Prandin at dinner       FLU annually ,Pneumovax ,aspirin daily,annual eye exam,microalbumin    2. HTN : Continue current therapy     3. Hyperlipidemia : Continue statin. 5.  Hypercalcemia: Nonsuppressed PTH    Primary hyperparathyroidism   24-hour urine calcium 270  PTH 40 with calcium more than 11  Osteopenia on DEXA  No restriction on dairy intake  Current calcium 10.8  Discussed surgical options: If calcium is persistently more than 11.2, if he has nephrolithiasis, worsening CKD, osteoporosis  At present he wants to monitor, if it worsens he will think about surgical options. There are no Patient Instructions on file for this visit. Thank you for allowing me to participate in the care of this patient. Jakob Edmonds MD      Patient verbalized understanding     Voice-recognition software was used to generate this report, which may result in some phonetic-based errors in the grammar and contents. Even though attempts were made to correct all the mistakes, some may have been missed and remained in the body of the report.

## 2019-11-04 NOTE — PROGRESS NOTES
Shazia Milan is a 75 y.o. male here for   Chief Complaint   Patient presents with    Diabetes       Functional glucose monitor and record keeping system? -yes   Eye exam within last year? -Dr. Sebastian Jin exam within last year? - on file    1. Have you been to the ER, urgent care clinic since your last visit? Hospitalized since your last visit? -no    2. Have you seen or consulted any other health care providers outside of the [address] [place] [address] since your last visit? Include any pap smears or colon screening. -PCP

## 2019-11-08 DIAGNOSIS — E11.65 TYPE 2 DIABETES MELLITUS WITH HYPERGLYCEMIA, WITHOUT LONG-TERM CURRENT USE OF INSULIN (HCC): ICD-10-CM

## 2019-11-08 RX ORDER — REPAGLINIDE 2 MG/1
2 TABLET ORAL
Qty: 90 TAB | Refills: 3 | Status: SHIPPED | OUTPATIENT
Start: 2019-11-08 | End: 2019-11-20 | Stop reason: ALTCHOICE

## 2019-11-13 LAB
HBA1C MFR BLD HPLC: 9 %
LDL-C, EXTERNAL: 79

## 2019-11-19 LAB — CREATININE, EXTERNAL: 0.72

## 2019-11-20 ENCOUNTER — TELEPHONE (OUTPATIENT)
Dept: ENDOCRINOLOGY | Age: 74
End: 2019-11-20

## 2019-11-20 DIAGNOSIS — E21.0 PRIMARY HYPERPARATHYROIDISM (HCC): Primary | ICD-10-CM

## 2019-11-20 DIAGNOSIS — E11.65 TYPE 2 DIABETES MELLITUS WITH HYPERGLYCEMIA, WITHOUT LONG-TERM CURRENT USE OF INSULIN (HCC): ICD-10-CM

## 2019-11-20 DIAGNOSIS — E21.0 PRIMARY HYPERPARATHYROIDISM (HCC): ICD-10-CM

## 2019-11-20 RX ORDER — REPAGLINIDE 0.5 MG/1
0.5 TABLET ORAL
Qty: 30 TAB | Refills: 2 | Status: SHIPPED | OUTPATIENT
Start: 2019-11-20 | End: 2019-11-20 | Stop reason: SDUPTHER

## 2019-11-20 RX ORDER — REPAGLINIDE 0.5 MG/1
0.5 TABLET ORAL
Qty: 30 TAB | Refills: 2 | Status: SHIPPED | OUTPATIENT
Start: 2019-11-20 | End: 2020-02-19 | Stop reason: ALTCHOICE

## 2019-11-20 RX ORDER — CINACALCET 30 MG/1
30 TABLET, FILM COATED ORAL DAILY
Qty: 30 TAB | Refills: 2 | Status: SHIPPED | OUTPATIENT
Start: 2019-11-20 | End: 2020-02-18

## 2019-11-20 RX ORDER — CINACALCET 30 MG/1
30 TABLET, FILM COATED ORAL DAILY
Qty: 30 TAB | Refills: 2 | Status: SHIPPED | OUTPATIENT
Start: 2019-11-20 | End: 2019-11-20 | Stop reason: SDUPTHER

## 2019-11-20 NOTE — TELEPHONE ENCOUNTER
Informed pt that calcium is >11 and surgery is recommended per Dr Gt Cruz. Pt states [place] is going [place] the [place] on [place] 4, [place] and will discuss with a surgeon after he returns.

## 2019-11-21 NOTE — PROGRESS NOTES
Calcium 11.6, PTH 40  Calcium persistently more than 11.2, he is having some symptoms      Ordered parathyroid scan  Referred to Dr. Sandra Araujo

## 2019-11-27 ENCOUNTER — DOCUMENTATION ONLY (OUTPATIENT)
Dept: ENDOCRINOLOGY | Age: 74
End: 2019-11-27

## 2020-01-28 ENCOUNTER — HOSPITAL ENCOUNTER (OUTPATIENT)
Dept: NUCLEAR MEDICINE | Age: 75
End: 2020-01-28
Attending: OTOLARYNGOLOGY
Payer: MEDICARE

## 2020-01-28 ENCOUNTER — HOSPITAL ENCOUNTER (OUTPATIENT)
Dept: ULTRASOUND IMAGING | Age: 75
Discharge: HOME | End: 2020-01-28
Attending: OTOLARYNGOLOGY
Payer: MEDICARE

## 2020-01-28 ENCOUNTER — HOSPITAL ENCOUNTER (OUTPATIENT)
Dept: NUCLEAR MEDICINE | Age: 75
Discharge: HOME | End: 2020-01-28
Attending: OTOLARYNGOLOGY
Payer: MEDICARE

## 2020-01-28 DIAGNOSIS — D35.1 BENIGN NEOPLASM OF PARATHYROID GLAND: ICD-10-CM

## 2020-01-28 PROCEDURE — 76536 US EXAM OF HEAD AND NECK: CPT

## 2020-01-28 PROCEDURE — 78070 PARATHYROID PLANAR IMAGING: CPT

## 2020-01-29 ENCOUNTER — HOSPITAL ENCOUNTER (OUTPATIENT)
Dept: ULTRASOUND IMAGING | Age: 75
End: 2020-01-29
Attending: OTOLARYNGOLOGY
Payer: MEDICARE

## 2020-01-29 ENCOUNTER — APPOINTMENT (OUTPATIENT)
Dept: NUCLEAR MEDICINE | Age: 75
End: 2020-01-29
Attending: OTOLARYNGOLOGY
Payer: MEDICARE

## 2020-02-19 ENCOUNTER — OFFICE VISIT (OUTPATIENT)
Dept: ENDOCRINOLOGY | Age: 75
End: 2020-02-19

## 2020-02-19 VITALS
OXYGEN SATURATION: 98 % | HEIGHT: 64 IN | RESPIRATION RATE: 16 BRPM | WEIGHT: 122 LBS | SYSTOLIC BLOOD PRESSURE: 133 MMHG | BODY MASS INDEX: 20.83 KG/M2 | TEMPERATURE: 96.7 F | DIASTOLIC BLOOD PRESSURE: 68 MMHG | HEART RATE: 74 BPM

## 2020-02-19 DIAGNOSIS — E11.65 TYPE 2 DIABETES MELLITUS WITH HYPERGLYCEMIA, WITHOUT LONG-TERM CURRENT USE OF INSULIN (HCC): Primary | ICD-10-CM

## 2020-02-19 DIAGNOSIS — E78.2 MIXED HYPERLIPIDEMIA: ICD-10-CM

## 2020-02-19 DIAGNOSIS — I10 ESSENTIAL HYPERTENSION: ICD-10-CM

## 2020-02-19 DIAGNOSIS — E21.0 PRIMARY HYPERPARATHYROIDISM (HCC): ICD-10-CM

## 2020-02-19 LAB
GLUCOSE POC: 322 MG/DL
HBA1C MFR BLD HPLC: 9 %

## 2020-02-19 RX ORDER — REPAGLINIDE 1 MG/1
1 TABLET ORAL
Qty: 90 TAB | Refills: 3 | Status: SHIPPED | OUTPATIENT
Start: 2020-02-19 | End: 2020-02-19 | Stop reason: SDUPTHER

## 2020-02-19 RX ORDER — REPAGLINIDE 1 MG/1
1 TABLET ORAL
Qty: 90 TAB | Refills: 3 | Status: SHIPPED | OUTPATIENT
Start: 2020-02-19 | End: 2020-03-13 | Stop reason: SDUPTHER

## 2020-02-19 NOTE — PROGRESS NOTES
Valerie Bartholomew MD        Patient Information  Date:2/19/2020  Name : Darrell Blake 75 y.o.     YOB: 1945         Referred by: Mikal Luevano MD         Chief Complaint   Patient presents with    Diabetes       History of Present Illness: Darrell Blake is a 75 y.o. male here for follow-up of  Type 2 Diabetes Mellitus. He has long-standing history of type 2 diabetes mellitus, at least 20 years ago, on oral medications. He was on insulin before which he has discontinued as he did not like. He is checking fasting, which are controlled, has postprandial hyperglycemia not checking blood glucose any other time      He is trying very hard to control blood glucose  Diet is healthy    He also has hypercalcemia with calcium more than 11, PTH nonsuppressed, DEXA scan showed osteopenia, seen Dr. Bard Mattson, scheduled for parathyroidectomy        Wt Readings from Last 3 Encounters:   02/19/20 122 lb (55.3 kg)   11/04/19 119 lb (54 kg)   07/24/19 119 lb (54 kg)       BP Readings from Last 3 Encounters:   02/19/20 133/68   11/04/19 151/61   07/24/19 136/66           Past Medical History:   Diagnosis Date    [facility]     HTN (hypertension)     Hypercalcemia     Hyperlipidemia     Vitamin D deficiency      Current Outpatient Medications   Medication Sig    repaglinide (PRANDIN) 1 mg tablet Take 1 Tab by mouth Before breakfast, lunch, and dinner.  losartan (COZAAR) 100 mg tablet Take 100 mg by mouth daily.  metoprolol succinate (TOPROL-XL) 25 mg XL tablet Take 25 mg by mouth daily.  metFORMIN ER (GLUCOPHAGE XR) 500 mg tablet Take 1,000 mg by mouth two (2) times a day.  atorvastatin (LIPITOR) 10 mg tablet Take  by mouth daily.  amLODIPine (NORVASC) 5 mg tablet Take 5 mg by mouth daily.  SITagliptin (JANUVIA) 100 mg tablet Take 100 mg by mouth daily. No current facility-administered medications for this visit. Allergies   Allergen Reactions    Aspirin Nausea Only       Review of Systems:  All systems reviewed and are negative other than mentioned in HPI    Physical Examination:   Blood pressure 133/68, pulse 74, temperature 96.7 °F (35.9 °C), temperature source Oral, resp. rate 16, height 5' 4\" (1.626 m), weight 122 lb (55.3 kg), SpO2 98 %. Estimated body mass index is 20.94 kg/m² as calculated from the following:    Height as of this encounter: 5' 4\" (1.626 m). -   Weight as of this encounter: 122 lb (55.3 kg). - General: pleasant, no distress, good eye contact  - HEENT: no pallor, no periorbital edema, EOMI  - Neck: supple,  - Cardiovascular: regular,  normal S1 and S2,  - Respiratory: clear to auscultation bilaterally  - Gastrointestinal: soft, nontender, nondistended,  BS +  - Musculoskeletal: no proximal muscle weakness in upper or lower extremities  - Integumentary: No edema  - Neurological: alert and oriented  - Psychiatric: normal mood and affect  - Skin: color, texture, turgor normal.     Diabetic foot exam: June 2019    Left Foot:   Visual Exam: normal    Pulse DP: 2+ (normal)   Filament test: reduced sensation          Right Foot:   Visual Exam: normal    Pulse DP: 2+ (normal)   Filament test: reduced sensation        Data Reviewed:         DEXA scan May 2018  Lumbar spine T score -1.2  Femoral neck T score -2.3  Right femoral neck T score -2.3  FRAX score major osteoporotic fractures is 5%, hip fracture 1.9%  No vertebral fractures reported    Assessment/Plan:     1. Type 2 diabetes mellitus with hyperglycemia, without long-term current use of insulin ([address]    2. Mixed hyperlipidemia    3. Essential hypertension        1.  Type 2 Diabetes Mellitus   Lab Results   Component Value Date/Time    Hemoglobin A1c (POC) 9 02/19/2020 09:34 AM    Hemoglobin A1c, External 9.0 11/13/2019   Uncontrolled  Low pancreatic reserve, he needs insulin but patient is not ready to start  Asked him to check the blood glucose before each meal, bring the log in 1 week  Prandin before each meal to see if it helps, if not he needs to go on insulin  Continue metformin and Januvia         FLU annually ,Pneumovax ,aspirin daily,annual eye exam,microalbumin    2. HTN : Continue current therapy     3. Hyperlipidemia : Continue statin. 5.  Hypercalcemia: Nonsuppressed PTH    Primary hyperparathyroidism   24-hour urine calcium 270  PTH 40 with calcium more than 11  Osteopenia on DEXA  Seen ENT for parathyroidectomy    Subcentimeter thyroid nodule on ultrasound    There are no Patient Instructions on file for this visit. Thank you for allowing me to participate in the care of this patient. Shireen Singleton MD      Patient verbalized understanding     Voice-recognition software was used to generate this report, which may result in some phonetic-based errors in the grammar and contents. Even though attempts were made to correct all the mistakes, some may have been missed and remained in the body of the report.

## 2020-02-19 NOTE — LETTER
2/20/20 Patient: Quiana Forte YOB: 1945 Date of Visit: 2/19/2020 Alena Chou MD 
[facility] [address] VIA Facsimile: [phone] Dear Alena Chou MD, Thank you for referring Mr. Markie Bassett to [id number] [place] [address] for evaluation. My notes for this consultation are attached. If you have questions, please do not hesitate to call me. I look forward to following your patient along with you. Sincerely, Ed Grissom MD

## 2020-02-19 NOTE — PROGRESS NOTES
Delio Wall is a 75 y.o. male here for   Chief Complaint   Patient presents with    Diabetes     Has upcoming Thyroid surgery    1. Have you been to the ER, urgent care clinic since your last visit? Hospitalized since your last visit? -no    2. Have you seen or consulted any other health care providers outside of the [address] [place] [address] since your last visit? Include any pap smears or colon screening. -PCP

## 2020-03-02 ENCOUNTER — TELEPHONE (OUTPATIENT)
Dept: ENDOCRINOLOGY | Age: 75
End: 2020-03-02

## 2020-03-02 DIAGNOSIS — I10 ESSENTIAL HYPERTENSION: ICD-10-CM

## 2020-03-02 DIAGNOSIS — E78.2 MIXED HYPERLIPIDEMIA: ICD-10-CM

## 2020-03-02 DIAGNOSIS — E11.65 TYPE 2 DIABETES MELLITUS WITH HYPERGLYCEMIA, WITHOUT LONG-TERM CURRENT USE OF INSULIN (HCC): ICD-10-CM

## 2020-03-02 NOTE — TELEPHONE ENCOUNTER
Patient would like a call back about the blood sugar log he dropped off last week for Dr. Bria Gray to review

## 2020-03-11 RX ORDER — LANCETS
EACH MISCELLANEOUS
Qty: 100 EACH | Refills: 3 | Status: CANCELLED | OUTPATIENT
Start: 2020-03-11

## 2020-03-11 RX ORDER — BLOOD-GLUCOSE METER
EACH MISCELLANEOUS
Qty: 1 EACH | Refills: 0 | Status: CANCELLED | OUTPATIENT
Start: 2020-03-11

## 2020-03-11 RX ORDER — REPAGLINIDE 1 MG/1
1 TABLET ORAL
Qty: 270 TAB | Refills: 3 | Status: CANCELLED | OUTPATIENT
Start: 2020-03-11

## 2020-03-13 ENCOUNTER — HOSPITAL ENCOUNTER (OUTPATIENT)
Dept: PREADMISSION TESTING | Age: 75
Discharge: HOME | End: 2020-03-13
Payer: MEDICARE

## 2020-03-13 VITALS
HEIGHT: 64 IN | HEART RATE: 67 BPM | TEMPERATURE: 98 F | BODY MASS INDEX: 20.51 KG/M2 | DIASTOLIC BLOOD PRESSURE: 75 MMHG | RESPIRATION RATE: 18 BRPM | SYSTOLIC BLOOD PRESSURE: 149 MMHG | WEIGHT: 120.13 LBS

## 2020-03-13 DIAGNOSIS — E11.65 TYPE 2 DIABETES MELLITUS WITH HYPERGLYCEMIA, WITHOUT LONG-TERM CURRENT USE OF INSULIN (HCC): Primary | ICD-10-CM

## 2020-03-13 DIAGNOSIS — I10 ESSENTIAL HYPERTENSION: ICD-10-CM

## 2020-03-13 DIAGNOSIS — E78.2 MIXED HYPERLIPIDEMIA: ICD-10-CM

## 2020-03-13 DIAGNOSIS — E11.65 TYPE 2 DIABETES MELLITUS WITH HYPERGLYCEMIA, WITHOUT LONG-TERM CURRENT USE OF INSULIN (HCC): ICD-10-CM

## 2020-03-13 LAB
ALBUMIN SERPL-MCNC: 3.5 G/DL (ref 3.5–5)
ALBUMIN/GLOB SERPL: 0.9 {RATIO} (ref 1.1–2.2)
ALP SERPL-CCNC: 77 U/L (ref 45–117)
ALT SERPL-CCNC: 20 U/L (ref 12–78)
ANION GAP SERPL CALC-SCNC: 3 MMOL/L (ref 5–15)
AST SERPL-CCNC: 10 U/L (ref 15–37)
ATRIAL RATE: 66 BPM
BASOPHILS # BLD: 0.1 K/UL (ref 0–0.1)
BASOPHILS NFR BLD: 1 % (ref 0–1)
BILIRUB SERPL-MCNC: 0.5 MG/DL (ref 0.2–1)
BUN SERPL-MCNC: 13 MG/DL (ref 6–20)
BUN/CREAT SERPL: 15 (ref 12–20)
CALCIUM SERPL-MCNC: 11.3 MG/DL (ref 8.5–10.1)
CALCULATED P AXIS, ECG09: 57 DEGREES
CALCULATED R AXIS, ECG10: -54 DEGREES
CALCULATED T AXIS, ECG11: 49 DEGREES
CHLORIDE SERPL-SCNC: 105 MMOL/L (ref 97–108)
CO2 SERPL-SCNC: 28 MMOL/L (ref 21–32)
CREAT SERPL-MCNC: 0.89 MG/DL (ref 0.7–1.3)
DIAGNOSIS, 93000: NORMAL
DIFFERENTIAL METHOD BLD: NORMAL
EOSINOPHIL # BLD: 0.4 K/UL (ref 0–0.4)
EOSINOPHIL NFR BLD: 5 % (ref 0–7)
ERYTHROCYTE [DISTWIDTH] IN BLOOD BY AUTOMATED COUNT: 13.2 % (ref 11.5–14.5)
GLOBULIN SER CALC-MCNC: 4.1 G/DL (ref 2–4)
GLUCOSE SERPL-MCNC: 223 MG/DL (ref 65–100)
HCT VFR BLD AUTO: 44.2 % (ref 36.6–50.3)
HGB BLD-MCNC: 13.5 G/DL (ref 12.1–17)
IMM GRANULOCYTES # BLD AUTO: 0 K/UL (ref 0–0.04)
IMM GRANULOCYTES NFR BLD AUTO: 0 % (ref 0–0.5)
LYMPHOCYTES # BLD: 1.9 K/UL (ref 0.8–3.5)
LYMPHOCYTES NFR BLD: 23 % (ref 12–49)
MCH RBC QN AUTO: 28.1 PG (ref 26–34)
MCHC RBC AUTO-ENTMCNC: 30.5 G/DL (ref 30–36.5)
MCV RBC AUTO: 92.1 FL (ref 80–99)
MONOCYTES # BLD: 0.8 K/UL (ref 0–1)
MONOCYTES NFR BLD: 10 % (ref 5–13)
NEUTS SEG # BLD: 5 K/UL (ref 1.8–8)
NEUTS SEG NFR BLD: 61 % (ref 32–75)
NRBC # BLD: 0 K/UL (ref 0–0.01)
NRBC BLD-RTO: 0 PER 100 WBC
P-R INTERVAL, ECG05: 198 MS
PLATELET # BLD AUTO: 239 K/UL (ref 150–400)
PMV BLD AUTO: 12.2 FL (ref 8.9–12.9)
POTASSIUM SERPL-SCNC: 4.5 MMOL/L (ref 3.5–5.1)
PROT SERPL-MCNC: 7.6 G/DL (ref 6.4–8.2)
Q-T INTERVAL, ECG07: 370 MS
QRS DURATION, ECG06: 88 MS
QTC CALCULATION (BEZET), ECG08: 387 MS
RBC # BLD AUTO: 4.8 M/UL (ref 4.1–5.7)
SODIUM SERPL-SCNC: 136 MMOL/L (ref 136–145)
VENTRICULAR RATE, ECG03: 66 BPM
WBC # BLD AUTO: 8.2 K/UL (ref 4.1–11.1)

## 2020-03-13 PROCEDURE — 80053 COMPREHEN METABOLIC PANEL: CPT

## 2020-03-13 PROCEDURE — 93005 ELECTROCARDIOGRAM TRACING: CPT

## 2020-03-13 PROCEDURE — 85025 COMPLETE CBC W/AUTO DIFF WBC: CPT

## 2020-03-13 RX ORDER — LANCETS
EACH MISCELLANEOUS
Qty: 100 EACH | Refills: 3 | Status: SHIPPED | OUTPATIENT
Start: 2020-03-13 | End: 2021-01-28

## 2020-03-13 RX ORDER — METOPROLOL SUCCINATE 25 MG/1
25 TABLET, EXTENDED RELEASE ORAL DAILY
COMMUNITY

## 2020-03-13 RX ORDER — REPAGLINIDE 1 MG/1
1 TABLET ORAL
Qty: 90 TAB | Refills: 3 | Status: SHIPPED | OUTPATIENT
Start: 2020-03-13 | End: 2020-06-19

## 2020-03-13 RX ORDER — ISOPROPYL ALCOHOL 70 ML/100ML
SWAB TOPICAL
Qty: 100 PAD | Refills: 3 | Status: SHIPPED | OUTPATIENT
Start: 2020-03-13 | End: 2021-01-28

## 2020-03-13 RX ORDER — BLOOD-GLUCOSE METER
EACH MISCELLANEOUS
Qty: 1 EACH | Refills: 1 | Status: SHIPPED | OUTPATIENT
Start: 2020-03-13

## 2020-03-13 RX ORDER — BLOOD GLUCOSE CONTROL HIGH,LOW
EACH MISCELLANEOUS
Qty: 1 BOTTLE | Refills: 1 | Status: SHIPPED | OUTPATIENT
Start: 2020-03-13

## 2020-03-16 NOTE — PERIOP NOTES
Preoperative and medication instructions reviewed with patient and daughter  Patient given  2 bottles of CHG soap. Instructions reviewed on use of CHG soap. Patient given SSI infection FAQS sheet, Patient and daughter was given the opportunity to ask questions on the information provided      Called cardiologist office last visit was in July 2017, Per [facility] staff his records are in archive.  Records will not be sent to [facility]
Spoke with Ryder Olivarez at Dr. Jennifer Gilmore office and reported abnormal CMP  Glucose:  223  Calcium:  11.3   HGBA1C 9.1  Lab results and EKG faxed to Dr. Jennifer Gilmore office and to PCP office.   Chart given to Abhi Gallagher NP
14-Mar-2017

## 2020-05-29 ENCOUNTER — TELEPHONE (OUTPATIENT)
Dept: ENDOCRINOLOGY | Age: 75
End: 2020-05-29

## 2020-06-19 ENCOUNTER — HOSPITAL ENCOUNTER (OUTPATIENT)
Dept: PREADMISSION TESTING | Age: 75
Discharge: HOME | End: 2020-06-19
Payer: MEDICARE

## 2020-06-19 VITALS
HEIGHT: 64 IN | WEIGHT: 119 LBS | DIASTOLIC BLOOD PRESSURE: 71 MMHG | SYSTOLIC BLOOD PRESSURE: 145 MMHG | TEMPERATURE: 97.8 F | BODY MASS INDEX: 20.32 KG/M2

## 2020-06-19 DIAGNOSIS — E11.65 TYPE 2 DIABETES MELLITUS WITH HYPERGLYCEMIA, WITHOUT LONG-TERM CURRENT USE OF INSULIN (HCC): ICD-10-CM

## 2020-06-19 DIAGNOSIS — E78.2 MIXED HYPERLIPIDEMIA: ICD-10-CM

## 2020-06-19 DIAGNOSIS — I10 ESSENTIAL HYPERTENSION: ICD-10-CM

## 2020-06-19 LAB
ALBUMIN SERPL-MCNC: 3.5 G/DL (ref 3.5–5)
ALBUMIN/GLOB SERPL: 0.9 {RATIO} (ref 1.1–2.2)
ALP SERPL-CCNC: 69 U/L (ref 45–117)
ALT SERPL-CCNC: 17 U/L (ref 12–78)
ANION GAP SERPL CALC-SCNC: 4 MMOL/L (ref 5–15)
AST SERPL-CCNC: 9 U/L (ref 15–37)
BASOPHILS # BLD: 0.1 K/UL (ref 0–0.1)
BASOPHILS NFR BLD: 1 % (ref 0–1)
BILIRUB SERPL-MCNC: 0.5 MG/DL (ref 0.2–1)
BUN SERPL-MCNC: 13 MG/DL (ref 6–20)
BUN/CREAT SERPL: 14 (ref 12–20)
CALCIUM SERPL-MCNC: 10.6 MG/DL (ref 8.5–10.1)
CHLORIDE SERPL-SCNC: 107 MMOL/L (ref 97–108)
CO2 SERPL-SCNC: 26 MMOL/L (ref 21–32)
CREAT SERPL-MCNC: 0.9 MG/DL (ref 0.7–1.3)
DIFFERENTIAL METHOD BLD: NORMAL
EOSINOPHIL # BLD: 0.4 K/UL (ref 0–0.4)
EOSINOPHIL NFR BLD: 5 % (ref 0–7)
ERYTHROCYTE [DISTWIDTH] IN BLOOD BY AUTOMATED COUNT: 13.2 % (ref 11.5–14.5)
GLOBULIN SER CALC-MCNC: 3.8 G/DL (ref 2–4)
GLUCOSE SERPL-MCNC: 203 MG/DL (ref 65–100)
HCT VFR BLD AUTO: 41.9 % (ref 36.6–50.3)
HGB BLD-MCNC: 13.2 G/DL (ref 12.1–17)
IMM GRANULOCYTES # BLD AUTO: 0 K/UL (ref 0–0.04)
IMM GRANULOCYTES NFR BLD AUTO: 0 % (ref 0–0.5)
LYMPHOCYTES # BLD: 2.2 K/UL (ref 0.8–3.5)
LYMPHOCYTES NFR BLD: 26 % (ref 12–49)
MCH RBC QN AUTO: 28.2 PG (ref 26–34)
MCHC RBC AUTO-ENTMCNC: 31.5 G/DL (ref 30–36.5)
MCV RBC AUTO: 89.5 FL (ref 80–99)
MONOCYTES # BLD: 0.8 K/UL (ref 0–1)
MONOCYTES NFR BLD: 9 % (ref 5–13)
NEUTS SEG # BLD: 5.1 K/UL (ref 1.8–8)
NEUTS SEG NFR BLD: 59 % (ref 32–75)
NRBC # BLD: 0 K/UL (ref 0–0.01)
NRBC BLD-RTO: 0 PER 100 WBC
PLATELET # BLD AUTO: 246 K/UL (ref 150–400)
PMV BLD AUTO: 12.2 FL (ref 8.9–12.9)
POTASSIUM SERPL-SCNC: 4.3 MMOL/L (ref 3.5–5.1)
PROT SERPL-MCNC: 7.3 G/DL (ref 6.4–8.2)
RBC # BLD AUTO: 4.68 M/UL (ref 4.1–5.7)
SODIUM SERPL-SCNC: 137 MMOL/L (ref 136–145)
WBC # BLD AUTO: 8.7 K/UL (ref 4.1–11.1)

## 2020-06-19 PROCEDURE — 85025 COMPLETE CBC W/AUTO DIFF WBC: CPT

## 2020-06-19 PROCEDURE — 80053 COMPREHEN METABOLIC PANEL: CPT

## 2020-06-19 RX ORDER — REPAGLINIDE 1 MG/1
TABLET ORAL
Qty: 270 TAB | Refills: 1 | Status: SHIPPED | OUTPATIENT
Start: 2020-06-19 | End: 2021-08-23

## 2020-06-19 NOTE — PERIOP NOTES
PATIENT GIVEN SURGICAL SITE INFECTION FAQ HANDOUT AND HAND WASHING TIP SHEET. PREOP INSTRUCTIONS REVIEWED AND PATIENT VERBALIZES UNDERSTANDING OF INSTRUCTIONS. PATIENT HAS BEEN GIVEN THE OPPORTUNITY TO ASK ADDITIONAL QUESTIONS. PATIENT CALLED AND MADE AWARE OF COVID-19 TESTING NEEDED TO BE DONE WITHIN 72 HOURS OF SURGERY. COVID-19 TESTING APPOINTMENT TO BE MADE FOR PATIENT VIA PRE ADMISSION TESTING DEPARTMENT. PATIENT INSTRUCTED ON SELF QUARANTINE BETWEEN TESTING AND ARRIVAL TIME ON DAY OF SURGERY. GAVE PATIENT 2 BOTTLES OF CHG CLEANSER AND INSTRUCTIONS, PATIENT VERBALIZED UNDERSTANDING.

## 2020-06-22 ENCOUNTER — HOSPITAL ENCOUNTER (OUTPATIENT)
Dept: PREADMISSION TESTING | Age: 75
Discharge: HOME | End: 2020-06-22
Payer: MEDICARE

## 2020-06-22 DIAGNOSIS — Z20.822 ENCOUNTER FOR LABORATORY TESTING FOR COVID-19 VIRUS: ICD-10-CM

## 2020-06-22 PROCEDURE — 87635 SARS-COV-2 COVID-19 AMP PRB: CPT

## 2020-06-22 NOTE — PERIOP NOTES
Spoke with [facility] at Dr. Prince Daily office and reported abnormal labs drawn in PAT:  Calcium  10.6  Glucose 203  Labs and EKG faxed to Dr. Prince Daily office.     Lab results faxed to PCP via cc

## 2020-06-23 LAB — SARS-COV-2, COV2NT: NOT DETECTED

## 2020-06-25 ENCOUNTER — ANESTHESIA EVENT (OUTPATIENT)
Dept: MEDSURG UNIT | Age: 75
End: 2020-06-25
Payer: MEDICARE

## 2020-06-26 ENCOUNTER — HOSPITAL ENCOUNTER (OUTPATIENT)
Age: 75
Setting detail: OUTPATIENT SURGERY
Discharge: HOME | End: 2020-06-26
Attending: OTOLARYNGOLOGY | Admitting: OTOLARYNGOLOGY
Payer: MEDICARE

## 2020-06-26 ENCOUNTER — APPOINTMENT (OUTPATIENT)
Dept: NUCLEAR MEDICINE | Age: 75
End: 2020-06-26
Attending: OTOLARYNGOLOGY
Payer: MEDICARE

## 2020-06-26 ENCOUNTER — ANESTHESIA (OUTPATIENT)
Dept: MEDSURG UNIT | Age: 75
End: 2020-06-26
Payer: MEDICARE

## 2020-06-26 VITALS
BODY MASS INDEX: 20.32 KG/M2 | TEMPERATURE: 97.6 F | HEART RATE: 66 BPM | HEIGHT: 64 IN | DIASTOLIC BLOOD PRESSURE: 74 MMHG | RESPIRATION RATE: 18 BRPM | WEIGHT: 119 LBS | SYSTOLIC BLOOD PRESSURE: 165 MMHG | OXYGEN SATURATION: 97 %

## 2020-06-26 DIAGNOSIS — E21.0 PRIMARY HYPERPARATHYROIDISM (HCC): ICD-10-CM

## 2020-06-26 PROBLEM — E21.3 HYPERPARATHYROIDISM (HCC): Status: ACTIVE | Noted: 2020-06-26

## 2020-06-26 LAB
GLUCOSE BLD STRIP.AUTO-MCNC: 140 MG/DL (ref 65–100)
INTRA-OP PTH, IPTHRT: 32.1 PG/ML (ref 18.4–88)
INTRA-OP PTH, IPTHRT: 99.2 PG/ML (ref 18.4–88)
PTH-INTACT IO % DIF SERPL: NORMAL %
SERVICE CMNT-IMP: ABNORMAL
SPECIMEN DRAWN SERPL: 1100
SPECIMEN DRAWN SERPL: 919

## 2020-06-26 PROCEDURE — 99218 HC RM OBSERVATION: CPT

## 2020-06-26 PROCEDURE — 77030011267 HC ELECTRD BLD COVD -A: Performed by: OTOLARYNGOLOGY

## 2020-06-26 PROCEDURE — 77030040356 HC CORD BPLR FRCP COVD -A: Performed by: OTOLARYNGOLOGY

## 2020-06-26 PROCEDURE — 83970 ASSAY OF PARATHORMONE: CPT

## 2020-06-26 PROCEDURE — 77030021052 HC RNG RETRCTR STAY COOP -A: Performed by: OTOLARYNGOLOGY

## 2020-06-26 PROCEDURE — 77030019655 HC PRB STIM CRAN MEDT -B: Performed by: OTOLARYNGOLOGY

## 2020-06-26 PROCEDURE — 74011000250 HC RX REV CODE- 250: Performed by: OTOLARYNGOLOGY

## 2020-06-26 PROCEDURE — 77030008698 HC TU ET REINF MEDT -D: Performed by: ANESTHESIOLOGY

## 2020-06-26 PROCEDURE — 74011250636 HC RX REV CODE- 250/636: Performed by: NURSE ANESTHETIST, CERTIFIED REGISTERED

## 2020-06-26 PROCEDURE — 74011250636 HC RX REV CODE- 250/636: Performed by: ANESTHESIOLOGY

## 2020-06-26 PROCEDURE — 88305 TISSUE EXAM BY PATHOLOGIST: CPT

## 2020-06-26 PROCEDURE — 77030040361 HC SLV COMPR DVT MDII -B: Performed by: OTOLARYNGOLOGY

## 2020-06-26 PROCEDURE — 76060000062 HC AMB SURG ANES 1 TO 1.5 HR: Performed by: OTOLARYNGOLOGY

## 2020-06-26 PROCEDURE — 77030031139 HC SUT VCRL2 J&J -A: Performed by: OTOLARYNGOLOGY

## 2020-06-26 PROCEDURE — A9500 TC99M SESTAMIBI: HCPCS

## 2020-06-26 PROCEDURE — 36415 COLL VENOUS BLD VENIPUNCTURE: CPT

## 2020-06-26 PROCEDURE — 77030002933 HC SUT MCRYL J&J -A: Performed by: OTOLARYNGOLOGY

## 2020-06-26 PROCEDURE — 76210000039 HC AMBSU PH I REC 3.5 TO 4 HR: Performed by: OTOLARYNGOLOGY

## 2020-06-26 PROCEDURE — 74011000250 HC RX REV CODE- 250: Performed by: NURSE ANESTHETIST, CERTIFIED REGISTERED

## 2020-06-26 PROCEDURE — 77030032988 HC TU ET NIM TRIVNTG EMG MEDT -D: Performed by: OTOLARYNGOLOGY

## 2020-06-26 PROCEDURE — 88331 PATH CONSLTJ SURG 1 BLK 1SPC: CPT

## 2020-06-26 PROCEDURE — 77030018836 HC SOL IRR NACL ICUM -A: Performed by: OTOLARYNGOLOGY

## 2020-06-26 PROCEDURE — 88332 PATH CONSLTJ SURG EA ADD BLK: CPT

## 2020-06-26 PROCEDURE — 74011250636 HC RX REV CODE- 250/636: Performed by: OTOLARYNGOLOGY

## 2020-06-26 PROCEDURE — 77030011247 HC DRN WND KT TLS STRY -B: Performed by: OTOLARYNGOLOGY

## 2020-06-26 PROCEDURE — 76030000019 HC AMB SURG 1 TO 1.5 HR INTENSV-TIER 1: Performed by: OTOLARYNGOLOGY

## 2020-06-26 PROCEDURE — 77030014008 HC SPNG HEMSTAT J&J -C: Performed by: OTOLARYNGOLOGY

## 2020-06-26 PROCEDURE — 77030011640 HC PAD GRND REM COVD -A: Performed by: OTOLARYNGOLOGY

## 2020-06-26 PROCEDURE — 77030031753 HC SHR ENDO COAG HARM J&J -E: Performed by: OTOLARYNGOLOGY

## 2020-06-26 PROCEDURE — 82962 GLUCOSE BLOOD TEST: CPT

## 2020-06-26 RX ORDER — SODIUM CHLORIDE 0.9 % (FLUSH) 0.9 %
5-40 SYRINGE (ML) INJECTION AS NEEDED
Status: CANCELLED | OUTPATIENT
Start: 2020-06-26

## 2020-06-26 RX ORDER — ONDANSETRON 8 MG/1
4-8 TABLET, ORALLY DISINTEGRATING ORAL
Qty: 15 TAB | Refills: 1 | Status: SHIPPED | OUTPATIENT
Start: 2020-06-26 | End: 2020-07-22

## 2020-06-26 RX ORDER — SODIUM CHLORIDE, SODIUM LACTATE, POTASSIUM CHLORIDE, CALCIUM CHLORIDE 600; 310; 30; 20 MG/100ML; MG/100ML; MG/100ML; MG/100ML
50 INJECTION, SOLUTION INTRAVENOUS CONTINUOUS
Status: DISCONTINUED | OUTPATIENT
Start: 2020-06-26 | End: 2020-06-26 | Stop reason: HOSPADM

## 2020-06-26 RX ORDER — DEXAMETHASONE SODIUM PHOSPHATE 4 MG/ML
INJECTION, SOLUTION INTRA-ARTICULAR; INTRALESIONAL; INTRAMUSCULAR; INTRAVENOUS; SOFT TISSUE AS NEEDED
Status: DISCONTINUED | OUTPATIENT
Start: 2020-06-26 | End: 2020-06-26 | Stop reason: HOSPADM

## 2020-06-26 RX ORDER — LIDOCAINE HYDROCHLORIDE AND EPINEPHRINE 10; 10 MG/ML; UG/ML
INJECTION, SOLUTION INFILTRATION; PERINEURAL AS NEEDED
Status: DISCONTINUED | OUTPATIENT
Start: 2020-06-26 | End: 2020-06-26 | Stop reason: HOSPADM

## 2020-06-26 RX ORDER — ACETAMINOPHEN 325 MG/1
650 TABLET ORAL
Status: CANCELLED | OUTPATIENT
Start: 2020-06-26

## 2020-06-26 RX ORDER — HYDROMORPHONE HYDROCHLORIDE 1 MG/ML
0.2 INJECTION, SOLUTION INTRAMUSCULAR; INTRAVENOUS; SUBCUTANEOUS
Status: DISCONTINUED | OUTPATIENT
Start: 2020-06-26 | End: 2020-06-26 | Stop reason: HOSPADM

## 2020-06-26 RX ORDER — OXYCODONE AND ACETAMINOPHEN 10; 325 MG/1; MG/1
1 TABLET ORAL
Status: CANCELLED | OUTPATIENT
Start: 2020-06-26

## 2020-06-26 RX ORDER — SODIUM CHLORIDE, SODIUM LACTATE, POTASSIUM CHLORIDE, CALCIUM CHLORIDE 600; 310; 30; 20 MG/100ML; MG/100ML; MG/100ML; MG/100ML
INJECTION, SOLUTION INTRAVENOUS
Status: DISCONTINUED | OUTPATIENT
Start: 2020-06-26 | End: 2020-06-26 | Stop reason: HOSPADM

## 2020-06-26 RX ORDER — LIDOCAINE HYDROCHLORIDE 10 MG/ML
0.1 INJECTION, SOLUTION EPIDURAL; INFILTRATION; INTRACAUDAL; PERINEURAL AS NEEDED
Status: DISCONTINUED | OUTPATIENT
Start: 2020-06-26 | End: 2020-06-26 | Stop reason: HOSPADM

## 2020-06-26 RX ORDER — MORPHINE SULFATE 10 MG/ML
2 INJECTION, SOLUTION INTRAMUSCULAR; INTRAVENOUS
Status: DISCONTINUED | OUTPATIENT
Start: 2020-06-26 | End: 2020-06-26 | Stop reason: HOSPADM

## 2020-06-26 RX ORDER — DEXMEDETOMIDINE HYDROCHLORIDE 100 UG/ML
INJECTION, SOLUTION INTRAVENOUS AS NEEDED
Status: DISCONTINUED | OUTPATIENT
Start: 2020-06-26 | End: 2020-06-26 | Stop reason: HOSPADM

## 2020-06-26 RX ORDER — FERROUS SULFATE, DRIED 160(50) MG
1 TABLET, EXTENDED RELEASE ORAL EVERY 6 HOURS
Qty: 136 TAB | Refills: 1 | Status: SHIPPED | OUTPATIENT
Start: 2020-06-26 | End: 2020-07-10

## 2020-06-26 RX ORDER — SODIUM CHLORIDE 0.9 % (FLUSH) 0.9 %
5-40 SYRINGE (ML) INJECTION AS NEEDED
Status: DISCONTINUED | OUTPATIENT
Start: 2020-06-26 | End: 2020-06-26 | Stop reason: HOSPADM

## 2020-06-26 RX ORDER — SODIUM CHLORIDE, SODIUM LACTATE, POTASSIUM CHLORIDE, CALCIUM CHLORIDE 600; 310; 30; 20 MG/100ML; MG/100ML; MG/100ML; MG/100ML
30 INJECTION, SOLUTION INTRAVENOUS CONTINUOUS
Status: DISCONTINUED | OUTPATIENT
Start: 2020-06-26 | End: 2020-06-26 | Stop reason: HOSPADM

## 2020-06-26 RX ORDER — OXYCODONE AND ACETAMINOPHEN 5; 325 MG/1; MG/1
1 TABLET ORAL
Qty: 28 TAB | Refills: 0 | Status: SHIPPED | OUTPATIENT
Start: 2020-06-26 | End: 2020-07-03

## 2020-06-26 RX ORDER — METOPROLOL SUCCINATE 25 MG/1
12.5 TABLET, EXTENDED RELEASE ORAL
Status: CANCELLED | OUTPATIENT
Start: 2020-06-26

## 2020-06-26 RX ORDER — CEFAZOLIN SODIUM/WATER 2 G/20 ML
2 SYRINGE (ML) INTRAVENOUS ONCE
Status: COMPLETED | OUTPATIENT
Start: 2020-06-26 | End: 2020-06-26

## 2020-06-26 RX ORDER — FERROUS SULFATE, DRIED 160(50) MG
1 TABLET, EXTENDED RELEASE ORAL EVERY 6 HOURS
Status: CANCELLED | OUTPATIENT
Start: 2020-06-26

## 2020-06-26 RX ORDER — LIDOCAINE HYDROCHLORIDE 20 MG/ML
INJECTION, SOLUTION EPIDURAL; INFILTRATION; INTRACAUDAL; PERINEURAL AS NEEDED
Status: DISCONTINUED | OUTPATIENT
Start: 2020-06-26 | End: 2020-06-26 | Stop reason: HOSPADM

## 2020-06-26 RX ORDER — METFORMIN HYDROCHLORIDE 500 MG/1
1000 TABLET, EXTENDED RELEASE ORAL 2 TIMES DAILY
Status: CANCELLED | OUTPATIENT
Start: 2020-06-26

## 2020-06-26 RX ORDER — PHENYLEPHRINE HCL IN 0.9% NACL 0.4MG/10ML
SYRINGE (ML) INTRAVENOUS AS NEEDED
Status: DISCONTINUED | OUTPATIENT
Start: 2020-06-26 | End: 2020-06-26 | Stop reason: HOSPADM

## 2020-06-26 RX ORDER — FENTANYL CITRATE 50 UG/ML
25 INJECTION, SOLUTION INTRAMUSCULAR; INTRAVENOUS
Status: DISCONTINUED | OUTPATIENT
Start: 2020-06-26 | End: 2020-06-26 | Stop reason: HOSPADM

## 2020-06-26 RX ORDER — SODIUM CHLORIDE 0.9 % (FLUSH) 0.9 %
5-40 SYRINGE (ML) INJECTION EVERY 8 HOURS
Status: DISCONTINUED | OUTPATIENT
Start: 2020-06-26 | End: 2020-06-26 | Stop reason: HOSPADM

## 2020-06-26 RX ORDER — ONDANSETRON 2 MG/ML
INJECTION INTRAMUSCULAR; INTRAVENOUS AS NEEDED
Status: DISCONTINUED | OUTPATIENT
Start: 2020-06-26 | End: 2020-06-26 | Stop reason: HOSPADM

## 2020-06-26 RX ORDER — PROPOFOL 10 MG/ML
INJECTION, EMULSION INTRAVENOUS AS NEEDED
Status: DISCONTINUED | OUTPATIENT
Start: 2020-06-26 | End: 2020-06-26 | Stop reason: HOSPADM

## 2020-06-26 RX ORDER — ONDANSETRON 2 MG/ML
4 INJECTION INTRAMUSCULAR; INTRAVENOUS AS NEEDED
Status: DISCONTINUED | OUTPATIENT
Start: 2020-06-26 | End: 2020-06-26 | Stop reason: HOSPADM

## 2020-06-26 RX ORDER — LOSARTAN POTASSIUM 50 MG/1
50 TABLET ORAL
Status: CANCELLED | OUTPATIENT
Start: 2020-06-26

## 2020-06-26 RX ORDER — AMLODIPINE BESYLATE 5 MG/1
10 TABLET ORAL
Status: CANCELLED | OUTPATIENT
Start: 2020-06-26

## 2020-06-26 RX ORDER — SUCCINYLCHOLINE CHLORIDE 20 MG/ML
INJECTION INTRAMUSCULAR; INTRAVENOUS AS NEEDED
Status: DISCONTINUED | OUTPATIENT
Start: 2020-06-26 | End: 2020-06-26 | Stop reason: HOSPADM

## 2020-06-26 RX ORDER — REPAGLINIDE 2 MG/1
1 TABLET ORAL
Status: CANCELLED | OUTPATIENT
Start: 2020-06-26

## 2020-06-26 RX ORDER — SODIUM CHLORIDE 0.9 % (FLUSH) 0.9 %
5-40 SYRINGE (ML) INJECTION EVERY 8 HOURS
Status: CANCELLED | OUTPATIENT
Start: 2020-06-26

## 2020-06-26 RX ORDER — OXYCODONE AND ACETAMINOPHEN 5; 325 MG/1; MG/1
1 TABLET ORAL
Status: CANCELLED | OUTPATIENT
Start: 2020-06-26

## 2020-06-26 RX ORDER — OXYCODONE HYDROCHLORIDE 5 MG/1
5 TABLET ORAL
Status: DISCONTINUED | OUTPATIENT
Start: 2020-06-26 | End: 2020-06-26 | Stop reason: HOSPADM

## 2020-06-26 RX ORDER — FENTANYL CITRATE 50 UG/ML
INJECTION, SOLUTION INTRAMUSCULAR; INTRAVENOUS AS NEEDED
Status: DISCONTINUED | OUTPATIENT
Start: 2020-06-26 | End: 2020-06-26 | Stop reason: HOSPADM

## 2020-06-26 RX ORDER — ONDANSETRON 2 MG/ML
4 INJECTION INTRAMUSCULAR; INTRAVENOUS
Status: CANCELLED | OUTPATIENT
Start: 2020-06-26

## 2020-06-26 RX ADMIN — SUCCINYLCHOLINE CHLORIDE 100 MG: 20 INJECTION, SOLUTION INTRAMUSCULAR; INTRAVENOUS at 10:10

## 2020-06-26 RX ADMIN — Medication 100 MCG: at 10:25

## 2020-06-26 RX ADMIN — FENTANYL CITRATE 75 MCG: 50 INJECTION, SOLUTION INTRAMUSCULAR; INTRAVENOUS at 10:10

## 2020-06-26 RX ADMIN — Medication 100 MCG: at 10:53

## 2020-06-26 RX ADMIN — DEXAMETHASONE SODIUM PHOSPHATE 4 MG: 4 INJECTION, SOLUTION INTRAMUSCULAR; INTRAVENOUS at 10:10

## 2020-06-26 RX ADMIN — ONDANSETRON HYDROCHLORIDE 4 MG: 2 INJECTION, SOLUTION INTRAMUSCULAR; INTRAVENOUS at 10:52

## 2020-06-26 RX ADMIN — FENTANYL CITRATE 25 MCG: 50 INJECTION INTRAMUSCULAR; INTRAVENOUS at 12:08

## 2020-06-26 RX ADMIN — Medication 2 G: at 10:05

## 2020-06-26 RX ADMIN — SODIUM CHLORIDE, SODIUM LACTATE, POTASSIUM CHLORIDE, AND CALCIUM CHLORIDE 50 ML/HR: 600; 310; 30; 20 INJECTION, SOLUTION INTRAVENOUS at 09:25

## 2020-06-26 RX ADMIN — Medication 100 MCG: at 10:28

## 2020-06-26 RX ADMIN — FENTANYL CITRATE 25 MCG: 50 INJECTION, SOLUTION INTRAMUSCULAR; INTRAVENOUS at 10:02

## 2020-06-26 RX ADMIN — SODIUM CHLORIDE, POTASSIUM CHLORIDE, SODIUM LACTATE AND CALCIUM CHLORIDE: 600; 310; 30; 20 INJECTION, SOLUTION INTRAVENOUS at 09:33

## 2020-06-26 RX ADMIN — PROPOFOL 10 MG: 10 INJECTION, EMULSION INTRAVENOUS at 10:02

## 2020-06-26 RX ADMIN — LIDOCAINE HYDROCHLORIDE 60 MG: 20 INJECTION, SOLUTION EPIDURAL; INFILTRATION; INTRACAUDAL; PERINEURAL at 10:10

## 2020-06-26 RX ADMIN — DEXMEDETOMIDINE HYDROCHLORIDE 10 MCG: 100 INJECTION, SOLUTION, CONCENTRATE INTRAVENOUS at 10:10

## 2020-06-26 RX ADMIN — PROPOFOL 150 MG: 10 INJECTION, EMULSION INTRAVENOUS at 10:10

## 2020-06-26 RX ADMIN — Medication 100 MCG: at 10:21

## 2020-06-26 NOTE — ANESTHESIA PREPROCEDURE EVALUATION
Relevant Problems   No relevant active problems       Anesthetic History   No history of anesthetic complications            Review of Systems / Medical History  Patient summary reviewed, nursing notes reviewed and pertinent labs reviewed    Pulmonary  Within defined limits                 Neuro/Psych   Within defined limits           Cardiovascular    Hypertension: well controlled              Exercise tolerance: >4 METS     GI/Hepatic/Renal     GERD: well controlled      PUD     Endo/Other    Diabetes: well controlled, type 2  Hypothyroidism: well controlled  Arthritis     Other Findings              Physical Exam    Airway  Mallampati: I  TM Distance: > 6 cm  Neck ROM: normal range of motion   Mouth opening: Normal     Cardiovascular    Rhythm: regular  Rate: normal         Dental  No notable dental hx       Pulmonary  Breath sounds clear to auscultation               Abdominal         Other Findings            Anesthetic Plan    ASA: 3  Anesthesia type: general          Induction: Intravenous  Anesthetic plan and risks discussed with: Patient

## 2020-06-26 NOTE — ROUTINE PROCESS
Patient: Jorge L Shane MRN: [id number]  SSN: [id number]   YOB: 1945  Age: 67 y.o. Sex: male     Patient is status post Procedure(s):  PARATHYROID EXPLORATION- MINIMALLY INVASIVE RADIO-GUIDED PARATHYROID EXPLORATION WITH IOPTH (800). Surgeon(s) and Role:     * Hemalatha Sandhu MD - Primary     * Jenny Mcwilliams MD - Resident - Codie Sheridan MD - Co-Surgeon    Local/Dose/Irrigation:  [facility]                  Peripheral IV 06/26/20 Left Hand (Active)   Site Assessment Clean, dry, & intact 6/26/2020  9:24 AM   Phlebitis Assessment 0 6/26/2020  9:24 AM   Infiltration Assessment 0 6/26/2020  9:24 AM   Dressing Status Clean, dry, & intact 6/26/2020  9:24 AM   Dressing Type Transparent 6/26/2020  9:24 AM            Airway - Endotracheal Tube 06/26/20 Oral (Active)                   Dressing/Packing:  Wound Neck anterior-Dressing Type: Adhesive wound closure strips (Steri-Strips); Adhesive wound dressing (Mastisol) (06/26/20 1050)    Splint/Cast:  ]    Other:

## 2020-06-26 NOTE — BRIEF OP NOTE
Brief Postoperative Note    Patient: Isreal Baugh  YOB: 1945  MRN: [id number]    Date of Procedure: 19 June 2020  Pre-operative Diagnosis: Primary Hyperparathyroidism  Post-operative Diagnosis: Primary Hyperparathyroidism  Procedure(s):   Neck exploration   Radioguided parathyroid surgery  Parathyroidectomy - resection of LEFT INFERIOR parathyroid adenoma  Parathyroidectomy - resection of RIGHT SUPERIOR parathyroid adenomaLaryngeal nerve monitoring  Surgeon(s) and Role:   Panel 1:   * Tal Vasquez MD - Co-surgeon   * Charisma Simon MD - Co-surgeon     Anesthesia: General     Estimated Blood Loss (mL): 2 ml    Complications: None    Specimens:   ID Type Source Tests Collected by Time Destination   1 : LEFT INFERIOR PARATHYROID ADENOMA Frozen Section Parathyroid  Saumya Taylor MD 6/26/2020 1039 Pathology   2 : RIGHT SUPERIOR PARATHYROID ADENOMA Frozen Section Parathyroid  Saumya Taylor MD 6/26/2020 1047 Pathology        Anesthesia: General +5 ml of Local (50:50 mix of 1% LIDO + EPI)   Urine output: Not documented   Estimated Blood Loss: 2 ml    IVF: 400 ml   Drains: None   Pre-operative iPTH = 99.2 pg/ml   Patient in room at 1002 hours   Antibiotic prophylaxis ANCEF 2 gm given at 1005 hours   Beta blocker taken by patient prior to surgery   Pre-prep time out: 1010 hours   Prayer at 1022 hours   Time out for Surgery at 1022 hours   (Correct patient, operative site and procedure confirmed, along with having the necessary equipment on hand to perform the operation safely)   Start of surgery at 1023 hours   End of surgery at 1058 hours   VTE prophylaxis with bilateral thigh high JARAD hose and bilateral lower extremity compression devices   Pressure points padded   Sponge, sharp and instrument count: Correct   History:  67-year-old male with symptomatic primary hyperparathyroidism and osteopenia -  DEXA scan May 2018  Lumbar spine T score -1.2  Femoral neck T score -2.3  Right femoral neck T score -2.3  FRAX score major osteoporotic fractures is 5%, hip fracture 1.9%  No vertebral fractures reported  Serum calcium >11 mg/dl, and non-suppressed iPTH 40 pg/ml. 24-hour urine calcium 270  No family history of thyroid or parathyroid disease. 01/28/2020  EXAM: NM PARATHYROID SCAN, US THYROID/PARATHYROID/SOFT TISS  INDICATION: Benign neoplasm of parathyroid gland. COMPARISON: None. CORRELATIVE IMAGING STUDIES: None  PARATHYROID SCAN:  TRACER: 20.8 mCi Tc 99m sestamibi. TECHNIQUE: Imaging of the neck and chest was performed in the anterior projection following the uneventful intravenous administration of Tc 99m sestamibi. Delayed images of the chest and neck were also obtained. FINDINGS: The initial images demonstrate physiologic tracer uptake in the salivary glands, thyroid, and myocardium. The delayed images demonstrate persistent increased tracer activity at the lower pole of the left gland. Small focus of persistent activity mid pole right gland. IMPRESSION: Persistent increased tracer activity lower pole left gland is concerning for  parathyroid adenoma. Possible small right parathyroid adenoma. THYROID ULTRASOUND:   Realtime sonographic imaging of the thyroid gland was performed. The right gland  measures 3.6 x 1.0 x 1.3 cm and the left gland measures 3.5 x 0.9 x 1.4 cm. The  isthmus measures 3 mm. The gland is normal in appearance without evidence of  mass lesion. There is a 1.6 x 0.5 x 1.0 cm heterogeneous soft tissue abnormality  inferior to the left thyroid gland. There is a 9 x 3 x 5 mm hypoechoic nodule  posterior to the mid pole right thyroid gland. IMPRESSION: 1.6 cm heterogeneous soft tissue abnormality inferior to the left  thyroid gland likely corresponds to the abnormality seen on the parathyroid scan  and may represent a parathyroid adenoma. Possible subcentimeter right sided  parathyroid adenoma. Pre-op holding area: The patient was seen in the pre-operative holding area. Patient was informed of the indications, nature, risks, benefits, alternatives and expected outcomes of the proposed operation:   Neck exploration   Radioguided parathyroid surgery / parathyroidectomy   All other indicated procedures   The patient previously voiced understanding of the aforesaid and provided informed consent for the planned operation   The patient asked pertinent questions, all of which were answered to his apparent satisfaction   The informed consent was reviewed in the holding area and the patient voiced understanding and agreement. The patient agreed to proceed with the recommended operative procedure (Neck exploration; radioguided parathyroid surgery / parathyroidectomy; all other indicated procedures). The patient was examined and the operative site was marked. Pre-operative Sestamibi Scan done prior to operation is localizing. Procedure (s) performed:   Neck exploration   Radioguided parathyroid surgery  Parathyroidectomy - resection of LEFT INFERIOR parathyroid adenoma  Parathyroidectomy - resection of RIGHT SUPERIOR parathyroid adenomaLaryngeal nerve monitoring  Laryngeal nerve monitoring   Findings:   Abnormal parathyroid gland identified - grossly consistent with adenoma in the LEFT INFERIOR anatomical location   Clinically normal left superior parathyroid gland  Abnormal parathyroid gland identified - grossly consistent with adenoma in the RIGHT SUPERIOR anatomical location   Clinically normal right inferior parathyroid gland    Left inferior parathyroid adenoma (22 x 15 mm; 1,670 mg); completely excised - intact and submitted to pathology. Frozen section: confirmatory - hypercellular parathyroid tissue     Left superior parathyroid gland, clinically normal (5 x 3 mm); No biopsy obtained. Right superior parathyroid adenoma (12 x 5 mm; 130 mg); completely excised - intact and submitted to pathology.    Frozen section: confirmatory - hypercellular parathyroid tissue     Right inferior parathyroid gland, clinically normal (6 x 4 mm); No biopsy obtained. The right thyroid measures 3.5 x 1.5 cm. The left thyroid measures 3.5 x 1.4 cm. Isthmus measures 3 mm. No findings suspicious for malignancy. No palpable central or lateral neck adenopathy. A diligent search of the central and both lateral areas of the neck was unrevealing, specifically there was no palpable adenopathy in either the right or left lateral (Level II, III, IV) neck, or the central compartment (Level VI and VII)     Both right and left recurrent laryngeal nerves were identified and carefully preserved throughout the entire course of the operation, and both recurrent laryngeal nerves were confirmed to be structurally and functionally intact. The structural and functional integrity of the left and right recurrent laryngeal nerves was confirmed with the nerve stimulator (Quintiq System), as prominent audible signal was attained when both branches of the left and right recurrent laryngeal nerves were stimulated. Excellent hemostasis confirmed at end of operation   Specimens:  Para-   thyroid  Right   Left      Type  Score  Type  Score    Upper    Upper      Parathyroid Adenoma   (12 x 5 mm in size;   weight 130 mg)   Completely excised   Frozen: Hypercellular parathyroid  Counts=68  Background=26 Parathyroid Clinically normal   (5 x 3 mm in size)  Biopsy not obtained  Frozen section: None     Lower         Parathyroid Clinically normal   (6 x 4 mm in size)  Biopsy not obtained  Frozen section: None     Parathyroid Adenoma   (22 x 15 mm in size;   weight 1,670 mg)   Completely excised   Frozen: Hypercellular parathyroid     Counts = 689  Background: 26          Operation:   The patient was escorted to the operating room   Upon arrival to the operative room, the patient, procedure, and operative site were confirmed via a pre-operative time-out. All were in agreement.    The patient was placed in the supine position and general anesthesia induced without incident, using a NIM endotracheal tube for the purpose of laryngeal nerve monitoring. Prophylactic antibiotic (ANCEF 2 gm IVPB) was administered prior to the procedure. Beta blocker taken by patient prior to operation  With the patient in the supine position the arms were tucked, a pillow was placed behind the knees and the heels padded; the neck was slightly extended, and head of the operating table elevated to 30 degrees. Roll was placed behind the scapulae to create mild degree of neck flexion   Pressure ulcer prophylaxis was in effect with pressure point padding   VTE prophylaxis was also in effect with JARAD hose and lower extremity mechanical compression devices   Sterile anterior neck prep (Chlorhexidine - alcohol) and drape   We prayed for our patient and we repeated the TIME OUT prior to commencing the operation to confirm the    correct patient,    correct operative site,    correct operative procedure, and    necessary equipment on hand to conduct the operation safely. Local anesthesia (5 ml of 50:50 mix of 1% LIDO + EPI) infiltrated subcutaneously at site of planned anterior cervical skin incision   Pre-operative serum iPTH level was 99.2 pg/ml. A 4 cm, anterior cervical incision made. Limited sub-platysmal flaps were created. NeoproWaveMAX Navigator GPS radio-immuno-guided surgery device was set on Tc99m and 100X   Strap musculature was  in the midline. A dissection plane was developed posterior to the left neck strap musculature using cautery and facilitated by atraumatic [facility] retractors. Елена Natter was used to displace the left thyroid lobe anteromedially, as the left lateral thyroid recess was developed initially using electrocautery, and then by gentle blunt dissection using a Kitner dissector. The fibroareolar floor was opened with electrocautery that provided access to the left posterior neck - retroesophageal space. We sought to identify the key structures for exposure of the left superior parathyroid gland -the arteriovenous band situated immediately cephalad to where the left middle thyroid vein is situated. The left superior parathyroid gland was situated just posterior and lateral to the encountered left recurrent laryngeal nerve   The left superior parathyroid gland was identified and found to be clinically normal in size and appearance (5 x 3 mm in size) - soft, tan, flattened, ovoid and smoothly encapsulated  We took care not to disrupt the parathyroid gland during the dissection, mobilization and complete evaluation of the left superior parathyroid gland. The normal appearing left superior parathyroid gland was not biopsied  ---   We began the search for the left inferior parathyroid gland by dissecting gently low into the thyro-thymic ligament and thymus, and then proceeding cephalad with the dissection. Dissection was performed using a combination of gentle blunt Kitner dissection, as well as the tip of a right-angle clamp to identify and expose a grossly abnormal appearing left inferior parathyroid gland. The left inferior parathyroid gland was situated just anterior and medial to the encountered left recurrent laryngeal nerve. The left inferior parathyroid gland was examined in its entirety and found to be clinically abnormal in size and appearance (22 x 15 mm in size) - grossly consistent with left inferior parathyroid gland adenoma. We took care not to disrupt the parathyroid gland capsule during the dissection, mobilization and complete evaluation of the left inferior parathyroid gland. ---   Attention was then directed to the right neck. A dissection plane was developed posterior to the right strap musculature using cautery and facilitated by atraumatic retractors.    Rolene Plants was used to displace the right thyroid lobe as the right lateral thyroid recess was developed using electrocautery, and then gentle blunt dissection using the Kitner dissector. The fibroareolar floor in the right posterior neck was opened with electrocautery that provided access to the right retroesophageal space. We sought to identify the key structures for exposure of the right superior parathyroid gland -the arteriovenous band situated immediately cephalad to where the right middle thyroid vein is situated, and posterior and lateral to the right recurrent laryngeal nerve. The superior parathyroid gland is usually encountered at the Ligament of Estrella dorsolateral to the recurrent nerve. The superior parathyroid gland originates from the 4th pharyngeal pouch, and is more posterior in position relative to the more anteriorly situated inferior parathyroid gland. The inferior parathyroid gland is usually encountered anterior and medial to the recurrent laryngeal nerve, inferior to where the recurrent nerve crosses the inferior thyroid artery. The inferior parathyroid gland originates from the 3rd pharyngeal pouch. Typically (~75% of cases), the superior parathyroid gland is situated near the posterior aspect of the thyroid gland, at the level of the cricothyroid junction, in proximity to where the recurrent laryngeal nerve enters the larynx. The superior parathyroid gland is consistently situated dorsal/posterior and lateral to the recurrent laryngeal nerve. In a smaller percentage of patients (~24% of cases) the superior parathyroid gland is adjacent to the superior pole of the thyroid gland. In a very small percentage of patients (~1%), the superior parathyroid gland is in a retro-pharyngeal, para-esophageal, retro-esophageal, or posterior-superior mediastinal location. Rarely is the superior parathyroid gland situated within the substance of the superior or lateral pole of the thyroid gland.   The right superior parathyroid gland was examined in its entirety and found to be clinically abnormal in size and appearance (12 x 5 mm in size) - grossly consistent with left inferior parathyroid gland adenoma. We took care not to disrupt the right superior parathyroid gland during the dissection, mobilization and complete evaluation of this parathyroid gland. The normal appearing right superior parathyroid gland was not biopsied. ---   We began the search for the right inferior parathyroid gland by dissecting gently low into the thyro-thymic ligament and apical thymus, and then proceeding cephalad with the dissection. The anatomic location of the inferior parathyroid gland is much more variable than that of the superior parathyroid gland, given the longer and more variable embryological migratory path of the 3rd pharyngeal pouch with the thymus. The inferior parathyroid gland may be embedded within the thymus, the thyroid gland, the anterior or posterior mediastinum. The most common location we find the inferior parathyroid gland (~40% of the time) is in proximity to the posterior thyroid capsule at the level of the 1st tracheal ring, close to the inferior thyroid artery - anterior and medial to the recurrent laryngeal nerve, inferior to where the recurrent nerve crosses the inferior thyroid artery. When we dont find the inferior parathyroid gland in this location we search for other possible anatomic locations - thyro-thymic ligament, within the substance of the thymus near the level of the thoracic inlet (~40% of cases the inferior parathyroid gland is found in this location). Other potential anatomic locations for the inferior parathyroid gland include lateral to the thyroid gland (15%), or in atypical or ectopic locations (carotid sheath or mid-thyroid level lateral to the sheath, or deep within the thyroid gland - 2% of cases). When not identified during initial operation, post-operative imaging is performed with persistent or recurrent hyperparathyroidism to search for the abnormal inferior parathyroid gland. Anatomic areas assessed during post-operative imaging include the anterior or posterior mediastinum, posterior to the trachea and anterior to the esophagus, or posterior to the esophagus, and at or above the angle of the mandible (to assess for an undescended parathyroid gland). Dissection was performed using a combination of gentle blunt Kitner dissection as well as the tip of a right-angle clamp to identify and expose a normal-appearing right inferior parathyroid gland (6 x 4 mm in size) - soft, tan, flattened, ovoid and smoothly encapsulated. We took care not to disrupt the right inferior parathyroid gland during the dissection, mobilization and complete evaluation of this parathyroid gland. The normal appearing right inferior parathyroid gland was not biopsied. ---   Having identified four parathyroid glands, two of which were abnormal in size and appearance (the left inferior and right superior parathyroid glands - grossly consistent with parathyroid adenoma), we re-directed our attention to the left neck. We took care not to disrupt the parathyroid gland during the dissection, mobilization and complete evaluation of the left inferior parathyroid gland. The left inferior parathyroid gland adenoma was excised (22 x 15 mm in size and 1,670 mg in weight with ex vivo counts of 689 pg/mg/min and background count of 26 pg/mg/min; i.e. hyperactive parathyroid), and submitted to frozen section, which was confirmatory - hypercellular parathyroid tissue     We re-directed our attention to the right neck. We took care not to disrupt the parathyroid gland during the dissection, mobilization and complete evaluation of the right superior parathyroid gland.    The right superior parathyroid gland adenoma was excised (12 x 5 mm in size and 130 mg in weight with ex vivo counts of 68 pg/mg/min and background count of 26 pg/mg/min; i.e. hyperactive parathyroid), and submitted to frozen section, which was confirmatory - hypercellular parathyroid tissue     Blood was drawn after the excision of the right superior parathyroid adenoma - serum iPTH 10 minutes following right superior parathyroid adenoma resection was 32.1 pg/ml. ---  Throughout our dissection on both sides of the neck we oriented on the recurrent laryngeal nerves, and we kept the dissection on the capsule of all four parathyroid glands, taking great care not to rupture the parathyroid gland capsule. We made certain to expose the entire parathyroid gland for each one of the four glands identified in order to assess each gland fully. As we did on the left, during the dissection on the right we took care to maintain the encountered recurrent laryngeal nerve out of harms way. With two normal appearing parathyroid glands identified (right inferior and left superior parathyroid glands), and two abnormal parathyroid glands excised (left inferior parathyroid adenoma and right superior parathyroid adenoma), and with normalization of serum iPTH after removal of the right superior parathyroid adenoma, we elected to conclude the operation. To summarize: 10 minutes post-resection of the right superior parathyroid adenoma, serum iPTH level dropped from 99.2 pg/ml to 32.1 pg/ml, biochemically consistent with curative resection according to our pre-specified criteria:   1. > 50% drop in serum iPTH from pre-operative baseline, and   2. drop of serum iPTH into the normal range. The structural and functional integrity of the left and right recurrent laryngeal nerves was confirmed with the nerve stimulator (Getfugu System), as a loud audible signal was attained when both branches of the left and right recurrent laryngeal nerves were stimulated.    A diligent search of the central and both lateral areas of the neck was unrevealing, specifically there was no palpable adenopathy in either the right or left lateral (Level II, III, IV) neck, or the central compartment (Level VI and VII)   Excellent hemostasis was confirmed   The operative site was irrigated with normal saline and excellent hemostasis confirmed   Excellent hemostasis in the operative field was re-confirmed under repeated Valsalva maneuver. The operative field was irrigated, and hemostasis once again confirmed   Surgicel was placed in the operative field as a hemostatic adjunct. The strap musculature (sternothyroid and sternohyoid muscles) was re-approximated in the midline with running 3-0 Vicryl suture. The platysma muscle was reconstituted with running absorbable (3-0 Vicryl) suture   The wound was irrigated with saline   The skin closure was completed with running subcuticular 5-0 monocryl suture and transversely oriented Steri-strips applied to the closed surgical incision   This was an uncomplicated operation with minimal blood loss. The patient was extubated without incident and escorted to the PACU in stable condition with aspiration precautions in effect   Complications: None   Implants: None   Disposition:    To PACU extubated and in stable condition with aspiration precautions in effect   Reina Jacques MD, MBA [facility]  Keiry Conn MD    Electronically Signed by Reina Jacques MD on 6/26/2020 at 12:54 PM

## 2020-06-26 NOTE — ANESTHESIA POSTPROCEDURE EVALUATION
Post-Anesthesia Evaluation and Assessment    Patient: Jackelyn Corral MRN: [id number]  SSN: [id number]    YOB: 1945  Age: 67 y.o. Sex: male      I have evaluated the patient and they are stable and ready for discharge from the PACU. Cardiovascular Function/Vital Signs  Visit Vitals  /67   Pulse (!) 58   Temp 36.4 °C (97.6 °F)   Resp 14   Ht 5' 4\" (1.626 m)   Wt 54 kg (119 lb)   SpO2 97%   BMI 20.43 kg/m²       Patient is status post General anesthesia for Procedure(s):  PARATHYROID EXPLORATION- MINIMALLY INVASIVE RADIO-GUIDED PARATHYROID EXPLORATION WITH IOPTH (800). Nausea/Vomiting: None    Postoperative hydration reviewed and adequate. Pain:  Pain Scale 1: Numeric (0 - 10) (06/26/20 1139)  Pain Intensity 1: 0 (06/26/20 1139)   Managed    Neurological Status:   Neuro (WDL): Exceptions to WDL (06/26/20 1120)  Neuro  Neurologic State: Anesthetized;Drowsy (06/26/20 1120)  LUE Motor Response: Spontaneous  (06/26/20 1120)  LLE Motor Response: Spontaneous  (06/26/20 1120)  RUE Motor Response: Spontaneous  (06/26/20 1120)  RLE Motor Response: Spontaneous  (06/26/20 1120)   At baseline    Mental Status, Level of Consciousness: Alert and  oriented to person, place, and time    Pulmonary Status:   O2 Device: Room air (06/26/20 1122)   Adequate oxygenation and airway patent    Complications related to anesthesia: None    Post-anesthesia assessment completed. No concerns    Signed By: [place] MD     June 26, 2020              Procedure(s):  PARATHYROID EXPLORATION- MINIMALLY INVASIVE RADIO-GUIDED PARATHYROID EXPLORATION WITH IOPTH (800). general    <BSHSIANPOST>    INITIAL Post-op Vital signs:   Vitals Value Taken Time   /67 6/26/2020 12:11 PM   Temp 36.4 °C (97.6 °F) 6/26/2020 11:22 AM   Pulse 57 6/26/2020 12:15 PM   Resp 14 6/26/2020 12:15 PM   SpO2 94 % 6/26/2020 12:15 PM   Vitals shown include unvalidated device data.

## 2020-06-26 NOTE — OP NOTES
Date of Procedure: 19 June 2020  Pre-operative Diagnosis: Primary Hyperparathyroidism  Post-operative Diagnosis: Primary Hyperparathyroidism  Procedure(s):   Neck exploration   Radioguided parathyroid surgery  Parathyroidectomy - resection of LEFT INFERIOR parathyroid adenoma  Parathyroidectomy - resection of RIGHT SUPERIOR parathyroid adenomaLaryngeal nerve monitoring  Surgeon(s) and Role:   Panel 1:   * Keiry Conn MD - Co-surgeon   * Reina Jacques MD - Co-surgeon   Anesthesia: General +5 ml of Local (50:50 mix of 1% LIDO + EPI)   Urine output: Not documented   Estimated Blood Loss: 2 ml    IVF: 400 ml   Drains: None   Pre-operative iPTH = 99.2 pg/ml   Patient in room at 1002 hours   Antibiotic prophylaxis ANCEF 2 gm given at 1005 hours   Beta blocker taken by patient prior to surgery   Pre-prep time out: 1010 hours   Prayer at 1022 hours   Time out for Surgery at 1022 hours   (Correct patient, operative site and procedure confirmed, along with having the necessary equipment on hand to perform the operation safely)   Start of surgery at 1023 hours   End of surgery at 1058 hours   VTE prophylaxis with bilateral thigh high JARAD hose and bilateral lower extremity compression devices   Pressure points padded   Sponge, sharp and instrument count: Correct   History:  50-year-old male with symptomatic primary hyperparathyroidism and osteopenia -  DEXA scan May 2018  Lumbar spine T score -1.2  Femoral neck T score -2.3  Right femoral neck T score -2.3  FRAX score major osteoporotic fractures is 5%, hip fracture 1.9%  No vertebral fractures reported  Serum calcium >11 mg/dl, and non-suppressed iPTH 40 pg/ml. 24-hour urine calcium 270  No family history of thyroid or parathyroid disease. 01/28/2020  EXAM: NM PARATHYROID SCAN, US THYROID/PARATHYROID/SOFT TISS  INDICATION: Benign neoplasm of parathyroid gland. COMPARISON: None.   CORRELATIVE IMAGING STUDIES: None  PARATHYROID SCAN:  TRACER: 20.8 mCi Tc 99m sestamibi. TECHNIQUE: Imaging of the neck and chest was performed in the anterior projection following the uneventful intravenous administration of Tc 99m sestamibi. Delayed images of the chest and neck were also obtained. FINDINGS: The initial images demonstrate physiologic tracer uptake in the salivary glands, thyroid, and myocardium. The delayed images demonstrate persistent increased tracer activity at the lower pole of the left gland. Small focus of persistent activity mid pole right gland. IMPRESSION: Persistent increased tracer activity lower pole left gland is concerning for  parathyroid adenoma. Possible small right parathyroid adenoma. THYROID ULTRASOUND:   Realtime sonographic imaging of the thyroid gland was performed. The right gland  measures 3.6 x 1.0 x 1.3 cm and the left gland measures 3.5 x 0.9 x 1.4 cm. The  isthmus measures 3 mm. The gland is normal in appearance without evidence of  mass lesion. There is a 1.6 x 0.5 x 1.0 cm heterogeneous soft tissue abnormality  inferior to the left thyroid gland. There is a 9 x 3 x 5 mm hypoechoic nodule  posterior to the mid pole right thyroid gland. IMPRESSION: 1.6 cm heterogeneous soft tissue abnormality inferior to the left  thyroid gland likely corresponds to the abnormality seen on the parathyroid scan  and may represent a parathyroid adenoma. Possible subcentimeter right sided  parathyroid adenoma. Pre-op holding area: The patient was seen in the pre-operative holding area.    Patient was informed of the indications, nature, risks, benefits, alternatives and expected outcomes of the proposed operation:   Neck exploration   Radioguided parathyroid surgery / parathyroidectomy   All other indicated procedures   The patient previously voiced understanding of the aforesaid and provided informed consent for the planned operation   The patient asked pertinent questions, all of which were answered to his apparent satisfaction   The informed consent was reviewed in the holding area and the patient voiced understanding and agreement. The patient agreed to proceed with the recommended operative procedure (Neck exploration; radioguided parathyroid surgery / parathyroidectomy; all other indicated procedures). The patient was examined and the operative site was marked. Pre-operative Sestamibi Scan done prior to operation is localizing. Procedure (s) performed:   Neck exploration   Radioguided parathyroid surgery  Parathyroidectomy - resection of LEFT INFERIOR parathyroid adenoma  Parathyroidectomy - resection of RIGHT SUPERIOR parathyroid adenomaLaryngeal nerve monitoring  Laryngeal nerve monitoring   Findings:   Abnormal parathyroid gland identified - grossly consistent with adenoma in the LEFT INFERIOR anatomical location   Clinically normal left superior parathyroid gland  Abnormal parathyroid gland identified - grossly consistent with adenoma in the RIGHT SUPERIOR anatomical location   Clinically normal right inferior parathyroid gland    Left inferior parathyroid adenoma (22 x 15 mm; 1,670 mg); completely excised - intact and submitted to pathology. Frozen section: confirmatory - hypercellular parathyroid tissue     Left superior parathyroid gland, clinically normal (5 x 3 mm); No biopsy obtained. Right superior parathyroid adenoma (12 x 5 mm; 130 mg); completely excised - intact and submitted to pathology. Frozen section: confirmatory - hypercellular parathyroid tissue     Right inferior parathyroid gland, clinically normal (6 x 4 mm); No biopsy obtained. The right thyroid measures 3.5 x 1.5 cm. The left thyroid measures 3.5 x 1.4 cm. Isthmus measures 3 mm. No findings suspicious for malignancy. No palpable central or lateral neck adenopathy.    A diligent search of the central and both lateral areas of the neck was unrevealing, specifically there was no palpable adenopathy in either the right or left lateral (Level II, III, IV) neck, or the central compartment (Level VI and VII)     Both right and left recurrent laryngeal nerves were identified and carefully preserved throughout the entire course of the operation, and both recurrent laryngeal nerves were confirmed to be structurally and functionally intact. The structural and functional integrity of the left and right recurrent laryngeal nerves was confirmed with the nerve stimulator (NIM System), as prominent audible signal was attained when both branches of the left and right recurrent laryngeal nerves were stimulated. Excellent hemostasis confirmed at end of operation   Specimens:  Para-   thyroid  Right   Left      Type  Score  Type  Score    Upper    Upper      Parathyroid Adenoma   (12 x 5 mm in size;   weight 130 mg)   Completely excised   Frozen: Hypercellular parathyroid  Counts=68  Background=26 Parathyroid Clinically normal   (5 x 3 mm in size)  Biopsy not obtained  Frozen section: None     Lower         Parathyroid Clinically normal   (6 x 4 mm in size)  Biopsy not obtained  Frozen section: None     Parathyroid Adenoma   (22 x 15 mm in size;   weight 1,670 mg)   Completely excised   Frozen: Hypercellular parathyroid     Counts = 689  Background: 26          Operation:   The patient was escorted to the operating room   Upon arrival to the operative room, the patient, procedure, and operative site were confirmed via a pre-operative time-out. All were in agreement. The patient was placed in the supine position and general anesthesia induced without incident, using a NIM endotracheal tube for the purpose of laryngeal nerve monitoring. Prophylactic antibiotic (ANCEF 2 gm IVPB) was administered prior to the procedure.    Beta blocker taken by patient prior to operation  With the patient in the supine position the arms were tucked, a pillow was placed behind the knees and the heels padded; the neck was slightly extended, and head of the operating table elevated to 30 degrees. Roll was placed behind the scapulae to create mild degree of neck flexion   Pressure ulcer prophylaxis was in effect with pressure point padding   VTE prophylaxis was also in effect with JARAD hose and lower extremity mechanical compression devices   Sterile anterior neck prep (Chlorhexidine - alcohol) and drape   We prayed for our patient and we repeated the TIME OUT prior to commencing the operation to confirm the    correct patient,    correct operative site,    correct operative procedure, and    necessary equipment on hand to conduct the operation safely. Local anesthesia (5 ml of 50:50 mix of 1% LIDO + EPI) infiltrated subcutaneously at site of planned anterior cervical skin incision   Pre-operative serum iPTH level was 99.2 pg/ml. A 4 cm, anterior cervical incision made. Limited sub-platysmal flaps were created. NeoproEvent[employer]us Navigator GPS radio-immuno-guided surgery device was set on Tc99m and 100X   Strap musculature was  in the midline. A dissection plane was developed posterior to the left neck strap musculature using cautery and facilitated by atraumatic [facility] retractors. Caroline Springer was used to displace the left thyroid lobe anteromedially, as the left lateral thyroid recess was developed initially using electrocautery, and then by gentle blunt dissection using a Kitner dissector. The fibroareolar floor was opened with electrocautery that provided access to the left posterior neck - retroesophageal space. We sought to identify the key structures for exposure of the left superior parathyroid gland -the arteriovenous band situated immediately cephalad to where the left middle thyroid vein is situated.    The left superior parathyroid gland was situated just posterior and lateral to the encountered left recurrent laryngeal nerve   The left superior parathyroid gland was identified and found to be clinically normal in size and appearance (5 x 3 mm in size) - soft, tan, flattened, ovoid and smoothly encapsulated  We took care not to disrupt the parathyroid gland during the dissection, mobilization and complete evaluation of the left superior parathyroid gland. The normal appearing left superior parathyroid gland was not biopsied  ---   We began the search for the left inferior parathyroid gland by dissecting gently low into the thyro-thymic ligament and thymus, and then proceeding cephalad with the dissection. Dissection was performed using a combination of gentle blunt Kitner dissection, as well as the tip of a right-angle clamp to identify and expose a grossly abnormal appearing left inferior parathyroid gland. The left inferior parathyroid gland was situated just anterior and medial to the encountered left recurrent laryngeal nerve. The left inferior parathyroid gland was examined in its entirety and found to be clinically abnormal in size and appearance (22 x 15 mm in size) - grossly consistent with left inferior parathyroid gland adenoma. We took care not to disrupt the parathyroid gland capsule during the dissection, mobilization and complete evaluation of the left inferior parathyroid gland. ---   Attention was then directed to the right neck. A dissection plane was developed posterior to the right strap musculature using cautery and facilitated by atraumatic retractors. [place] Justin was used to displace the right thyroid lobe as the right lateral thyroid recess was developed using electrocautery, and then gentle blunt dissection using the Kitner dissector. The fibroareolar floor in the right posterior neck was opened with electrocautery that provided access to the right retroesophageal space. We sought to identify the key structures for exposure of the right superior parathyroid gland -the arteriovenous band situated immediately cephalad to where the right middle thyroid vein is situated, and posterior and lateral to the right recurrent laryngeal nerve.    The superior parathyroid gland is usually encountered at the Ligament of Estrella dorsolateral to the recurrent nerve. The superior parathyroid gland originates from the 4th pharyngeal pouch, and is more posterior in position relative to the more anteriorly situated inferior parathyroid gland. The inferior parathyroid gland is usually encountered anterior and medial to the recurrent laryngeal nerve, inferior to where the recurrent nerve crosses the inferior thyroid artery. The inferior parathyroid gland originates from the 3rd pharyngeal pouch. Typically (~75% of cases), the superior parathyroid gland is situated near the posterior aspect of the thyroid gland, at the level of the cricothyroid junction, in proximity to where the recurrent laryngeal nerve enters the larynx. The superior parathyroid gland is consistently situated dorsal/posterior and lateral to the recurrent laryngeal nerve. In a smaller percentage of patients (~24% of cases) the superior parathyroid gland is adjacent to the superior pole of the thyroid gland. In a very small percentage of patients (~1%), the superior parathyroid gland is in a retro-pharyngeal, para-esophageal, retro-esophageal, or posterior-superior mediastinal location. Rarely is the superior parathyroid gland situated within the substance of the superior or lateral pole of the thyroid gland. The right superior parathyroid gland was examined in its entirety and found to be clinically abnormal in size and appearance (12 x 5 mm in size) - grossly consistent with left inferior parathyroid gland adenoma. We took care not to disrupt the right superior parathyroid gland during the dissection, mobilization and complete evaluation of this parathyroid gland. The normal appearing right superior parathyroid gland was not biopsied.   ---   We began the search for the right inferior parathyroid gland by dissecting gently low into the thyro-thymic ligament and apical thymus, and then proceeding cephalad with the dissection. The anatomic location of the inferior parathyroid gland is much more variable than that of the superior parathyroid gland, given the longer and more variable embryological migratory path of the 3rd pharyngeal pouch with the thymus. The inferior parathyroid gland may be embedded within the thymus, the thyroid gland, the anterior or posterior mediastinum. The most common location we find the inferior parathyroid gland (~40% of the time) is in proximity to the posterior thyroid capsule at the level of the 1st tracheal ring, close to the inferior thyroid artery - anterior and medial to the recurrent laryngeal nerve, inferior to where the recurrent nerve crosses the inferior thyroid artery. When we dont find the inferior parathyroid gland in this location we search for other possible anatomic locations - thyro-thymic ligament, within the substance of the thymus near the level of the thoracic inlet (~40% of cases the inferior parathyroid gland is found in this location). Other potential anatomic locations for the inferior parathyroid gland include lateral to the thyroid gland (15%), or in atypical or ectopic locations (carotid sheath or mid-thyroid level lateral to the sheath, or deep within the thyroid gland - 2% of cases). When not identified during initial operation, post-operative imaging is performed with persistent or recurrent hyperparathyroidism to search for the abnormal inferior parathyroid gland. Anatomic areas assessed during post-operative imaging include the anterior or posterior mediastinum, posterior to the trachea and anterior to the esophagus, or posterior to the esophagus, and at or above the angle of the mandible (to assess for an undescended parathyroid gland).   Dissection was performed using a combination of gentle blunt Kitner dissection as well as the tip of a right-angle clamp to identify and expose a normal-appearing right inferior parathyroid gland (6 x 4 mm in size) - soft, tan, flattened, ovoid and smoothly encapsulated. We took care not to disrupt the right inferior parathyroid gland during the dissection, mobilization and complete evaluation of this parathyroid gland. The normal appearing right inferior parathyroid gland was not biopsied. ---   Having identified four parathyroid glands, two of which were abnormal in size and appearance (the left inferior and right superior parathyroid glands - grossly consistent with parathyroid adenoma), we re-directed our attention to the left neck. We took care not to disrupt the parathyroid gland during the dissection, mobilization and complete evaluation of the left inferior parathyroid gland. The left inferior parathyroid gland adenoma was excised (22 x 15 mm in size and 1,670 mg in weight with ex vivo counts of 689 pg/mg/min and background count of 26 pg/mg/min; i.e. hyperactive parathyroid), and submitted to frozen section, which was confirmatory - hypercellular parathyroid tissue     We re-directed our attention to the right neck. We took care not to disrupt the parathyroid gland during the dissection, mobilization and complete evaluation of the right superior parathyroid gland. The right superior parathyroid gland adenoma was excised (12 x 5 mm in size and 130 mg in weight with ex vivo counts of 68 pg/mg/min and background count of 26 pg/mg/min; i.e. hyperactive parathyroid), and submitted to frozen section, which was confirmatory - hypercellular parathyroid tissue     Blood was drawn after the excision of the right superior parathyroid adenoma - serum iPTH 10 minutes following right superior parathyroid adenoma resection was 32.1 pg/ml. ---  Throughout our dissection on both sides of the neck we oriented on the recurrent laryngeal nerves, and we kept the dissection on the capsule of all four parathyroid glands, taking great care not to rupture the parathyroid gland capsule.    We made certain to expose the entire parathyroid gland for each one of the four glands identified in order to assess each gland fully. As we did on the left, during the dissection on the right we took care to maintain the encountered recurrent laryngeal nerve out of harms way. With two normal appearing parathyroid glands identified (right inferior and left superior parathyroid glands), and two abnormal parathyroid glands excised (left inferior parathyroid adenoma and right superior parathyroid adenoma), and with normalization of serum iPTH after removal of the right superior parathyroid adenoma, we elected to conclude the operation. To summarize: 10 minutes post-resection of the right superior parathyroid adenoma, serum iPTH level dropped from 99.2 pg/ml to 32.1 pg/ml, biochemically consistent with curative resection according to our pre-specified criteria:   1. > 50% drop in serum iPTH from pre-operative baseline, and   2. drop of serum iPTH into the normal range. The structural and functional integrity of the left and right recurrent laryngeal nerves was confirmed with the nerve stimulator (GoComm System), as a loud audible signal was attained when both branches of the left and right recurrent laryngeal nerves were stimulated. A diligent search of the central and both lateral areas of the neck was unrevealing, specifically there was no palpable adenopathy in either the right or left lateral (Level II, III, IV) neck, or the central compartment (Level VI and VII)   Excellent hemostasis was confirmed   The operative site was irrigated with normal saline and excellent hemostasis confirmed   Excellent hemostasis in the operative field was re-confirmed under repeated Valsalva maneuver. The operative field was irrigated, and hemostasis once again confirmed   Surgicel was placed in the operative field as a hemostatic adjunct.    The strap musculature (sternothyroid and sternohyoid muscles) was re-approximated in the midline with running 3-0 Vicryl suture. The platysma muscle was reconstituted with running absorbable (3-0 Vicryl) suture   The wound was irrigated with saline   The skin closure was completed with running subcuticular 5-0 monocryl suture and transversely oriented Steri-strips applied to the closed surgical incision   This was an uncomplicated operation with minimal blood loss. The patient was extubated without incident and escorted to the PACU in stable condition with aspiration precautions in effect   Complications: None   Implants: None   Disposition:    To PACU extubated and in stable condition with aspiration precautions in effect   Rafa Osuna MD [facility] FACS  Chang Tracy MD

## 2020-06-26 NOTE — DISCHARGE INSTRUCTIONS
Post  Parathyroidectomy Instructions    Follow up: with Dr. Annie Barrow 2 weeks after surgery to have your steristrips removed. Shortly after surgery call [phone] to schedule this appointment.  Eat regular foods.  You may shower in 24 hours. Do not allow direct water pressure on your wound. If water trickles down while washing your hair, allow the wound to dry on its own.  Do not scrub or soak your wound for 2 weeks or 14 days.  No strenuous activity: for 14 days.  No moving more than 15 pounds: for 14 days. Then includes pulling, pushing, tugging, throwing.  There is generally not a lot of pain: with this surgery. Take your pain medication as needed. Most patients, if they do have pain, will have neck stiffness/discomfort. You may have numbness or tingling surrounding the area of your wound. Narcotics can cause constipation; use your Colace if this is the case.  Nausea and vomiting: from lingering effects of general anesthesia usually resolves by the following day. The narcotic pain medication can cause nausea and vomiting. They should be taken with food or fluids to minimize this. Medications that reduce nausea and vomiting can be prescribed by your physician.  Fever above 100.4, redness around wound, pus drainage from wound: call your doctor.  Bleeding: is uncommon (less than 1%). If it does occur your neck will develop a fullness. It is good to take a look at your neck shortly after surgery to see what a baseline appearance is. If there are changes to this, then call your provider. \    CALL [phone] if concerns - there is a doctor on call 24/7    Special Instructions    o You may be on Calcium ([place]) - it is very important that you take this.   Dr. Annie Barrow will start a taper at your follow up visit  o If you experience tingling in the hands or around your mouth, your calcium may be dropping, go to the emergency room immediately and tell them you had your parathyroid removed. DISCHARGE SUMMARY from Nurse    PATIENT INSTRUCTIONS:    After general anesthesia or intravenous sedation, for 24 hours or while taking prescription Narcotics:  · Limit your activities  · Do not drive and operate hazardous machinery  · Do not make important personal or business decisions  · Do  not drink alcoholic beverages  · If you have not urinated within 8 hours after discharge, please contact your surgeon on call.     Report the following to your surgeon:  · Excessive pain, swelling, redness or odor of or around the surgical area  · Temperature over 100.5  · Nausea and vomiting lasting longer than 4 hours or if unable to take medications  · Any signs of decreased circulation or nerve impairment to extremity: change in color, persistent  numbness, tingling, coldness or increase pain  · Any questions

## 2020-07-22 ENCOUNTER — OFFICE VISIT (OUTPATIENT)
Dept: ENDOCRINOLOGY | Age: 75
End: 2020-07-22

## 2020-07-22 VITALS
HEIGHT: 64 IN | RESPIRATION RATE: 14 BRPM | SYSTOLIC BLOOD PRESSURE: 142 MMHG | WEIGHT: 121 LBS | BODY MASS INDEX: 20.66 KG/M2 | DIASTOLIC BLOOD PRESSURE: 64 MMHG | TEMPERATURE: 96.5 F | OXYGEN SATURATION: 97 % | HEART RATE: 70 BPM

## 2020-07-22 DIAGNOSIS — I10 ESSENTIAL HYPERTENSION: ICD-10-CM

## 2020-07-22 DIAGNOSIS — E21.3 HYPERPARATHYROIDISM (HCC): ICD-10-CM

## 2020-07-22 DIAGNOSIS — E11.65 TYPE 2 DIABETES MELLITUS WITH HYPERGLYCEMIA, WITHOUT LONG-TERM CURRENT USE OF INSULIN (HCC): ICD-10-CM

## 2020-07-22 DIAGNOSIS — E78.2 MIXED HYPERLIPIDEMIA: ICD-10-CM

## 2020-07-22 DIAGNOSIS — E11.65 TYPE 2 DIABETES MELLITUS WITH HYPERGLYCEMIA, WITHOUT LONG-TERM CURRENT USE OF INSULIN (HCC): Primary | ICD-10-CM

## 2020-07-22 NOTE — PROGRESS NOTES
Hansel Torrez MD        Patient Information  Date:7/22/2020  Name : Kip Rosas 67 y.o.     YOB: 1945         Referred by: Cassandra Dong MD         Chief Complaint   Patient presents with    Diabetes    Thyroid Problem       History of Present Illness: Kip Rosas is a 67 y.o. male here for follow-up of  Type 2 Diabetes Mellitus. He has long-standing history of type 2 diabetes mellitus, at least 20 years ago, on oral medications. He was on insulin before which he has discontinued as he did not like. He is checking fasting, which are controlled,  On repaglinide, 2 episodes of hypoglycemia before lunch, has not noticed any recently    He is trying very hard to control blood glucose  Diet is healthy    He also has hypercalcemia with calcium more than 11, PTH nonsuppressed, DEXA scan showed osteopenia, status post parathyroidectomy, 2 gland by   On calcium supplements  No hypocalcemic symptoms          Wt Readings from Last 3 Encounters:   07/22/20 121 lb (54.9 kg)   06/26/20 119 lb (54 kg)   06/19/20 119 lb (54 kg)       BP Readings from Last 3 Encounters:   07/22/20 142/64   06/26/20 165/74   06/19/20 145/71           Past Medical History:   Diagnosis Date    Arthritis     FINGERS, NECK-OSTEO    DM (diabetes mellitus) (HCC)     GERD (gastroesophageal reflux disease)     HTN (hypertension)     Hypercalcemia     Hyperlipidemia     PUD (peptic ulcer disease)     Thyroid disease     Vitamin D deficiency      Current Outpatient Medications   Medication Sig    calcium carbonate (CALCIUM 500 PO) Take  by mouth.  metoprolol succinate (TOPROL-XL) 25 mg XL tablet Take 12.5 mg by mouth nightly.  glucose blood VI test strips (Accu-Chek Angelica Plus test strp) strip Use to check BG daily. Dx code E11.65    Blood-Glucose Meter (Accu-Chek Angelica Plus Meter) misc Use to check BG daily.  Dx code E11.65    lancets (Accu-Chek Softclix Lancets) misc Use to check BG daily. Dx code E11.65    alcohol swabs padm Use to check BG daily. Dx code E11.65    losartan (COZAAR) 100 mg tablet Take 50 mg by mouth every morning.  metFORMIN ER (GLUCOPHAGE XR) 500 mg tablet Take 1,000 mg by mouth two (2) times a day.  atorvastatin (LIPITOR) 10 mg tablet Take  by mouth every morning.  amLODIPine (NORVASC) 5 mg tablet Take 10 mg by mouth nightly.  SITagliptin (JANUVIA) 100 mg tablet Take 100 mg by mouth nightly. Daily before dinner    repaglinide (PRANDIN) 1 mg tablet TAKE 1 TABLET THREE TIMES DAILY    Blood Glucose Control High&Low (Accu-Chek Angelica Control Soln) soln Use as directed. Dx code E11.65     No current facility-administered medications for this visit. Allergies   Allergen Reactions    Aspirin Nausea Only       Review of Systems:  All systems reviewed and are negative other than mentioned in HPI    Physical Examination:   Blood pressure 142/64, pulse 70, temperature (!) 96.5 °F (35.8 °C), temperature source Oral, resp. rate 14, height 5' 4\" (1.626 m), weight 121 lb (54.9 kg), SpO2 97 %. Estimated body mass index is 20.77 kg/m² as calculated from the following:    Height as of this encounter: 5' 4\" (1.626 m). -   Weight as of this encounter: 121 lb (54.9 kg).   - General: pleasant, no distress, good eye contact  - HEENT: no pallor, no periorbital edema, EOMI  - Neck: supple,  - Cardiovascular: regular,  normal S1 and S2,  - Respiratory: clear to auscultation bilaterally  - Gastrointestinal: soft, nontender, nondistended,  BS +  - Musculoskeletal: no proximal muscle weakness in upper or lower extremities  - Integumentary: No edema  - Neurological: alert and oriented  - Psychiatric: normal mood and affect  - Skin: color, texture, turgor normal.     Diabetic foot exam: June 2019    Left Foot:   Visual Exam: normal    Pulse DP: 2+ (normal)   Filament test: reduced sensation          Right Foot:   Visual Exam: normal    Pulse DP: 2+ (normal)   Filament test: reduced sensation        Data Reviewed:         DEXA scan May 2018  Lumbar spine T score -1.2  Femoral neck T score -2.3  Right femoral neck T score -2.3  FRAX score major osteoporotic fractures is 5%, hip fracture 1.9%  No vertebral fractures reported    Assessment/Plan:     1. Type 2 diabetes mellitus with hyperglycemia, without long-term current use of insulin ([address]    2. Hyperparathyroidism ([address]    3. Essential hypertension    4. Mixed hyperlipidemia        1. Type 2 Diabetes Mellitus   Lab Results   Component Value Date/Time    Hemoglobin A1c (POC) 9 02/19/2020 09:34 AM    Hemoglobin A1c, External 9.0 11/13/2019   Blood glucose has improved  Due for A1c  Low pancreatic reserve,  Continue metformin, Januvia, Prandin  If he has frequent hypoglycemia to call us, to take half the dose of Prandin for breakfast if he is eating a small breakfast.         FLU annually ,Pneumovax ,aspirin daily,annual eye exam,microalbumin    2. HTN : Continue current therapy     3. Hyperlipidemia : Continue statin. 5.  Hypercalcemia: Primary hyperparathyroidism   24-hour urine calcium 270  PTH 40 with calcium more than 11  Osteopenia on DEXA  Status post 2 gland parathyroidectomy  Labs    Subcentimeter thyroid nodule on ultrasound    There are no Patient Instructions on file for this visit. Thank you for allowing me to participate in the care of this patient. Breanne Vee MD      Patient verbalized understanding     Voice-recognition software was used to generate this report, which may result in some phonetic-based errors in the grammar and contents. Even though attempts were made to correct all the mistakes, some may have been missed and remained in the body of the report.

## 2020-07-22 NOTE — LETTER
7/23/20 Patient: Binh Post YOB: 1945 Date of Visit: 7/22/2020 Justo Santos MD 
[address] [facility] [address] VIA Facsimile: [phone] Dear Justo Santos MD, Thank you for referring Mr. Deb Dubois to [address] [place] [address] for evaluation. My notes for this consultation are attached. If you have questions, please do not hesitate to call me. I look forward to following your patient along with you. Sincerely, Lydia Berkowitz MD

## 2020-07-22 NOTE — PROGRESS NOTES
Alton Llanes is a 67 y.o. male here for   Chief Complaint   Patient presents with    Diabetes    Thyroid Problem       1. Have you been to the ER, urgent care clinic since your last visit? Hospitalized since your last visit? - parathyroid removed since last visit     2. Have you seen or consulted any other health care providers outside of the [address] [place] [address] since your last visit?   Include any pap smears or colon screening.- Dr Rafa Garcia placed for pt,  per Verbal Order with read back from Dr Andrey Purdy 7/22/2020

## 2020-09-04 LAB
25(OH)D3+25(OH)D2 SERPL-MCNC: 36.7 NG/ML (ref 30–100)
PTH-INTACT SERPL-MCNC: 14 PG/ML (ref 15–65)

## 2020-10-08 ENCOUNTER — TELEPHONE (OUTPATIENT)
Dept: ENDOCRINOLOGY | Age: 75
End: 2020-10-08

## 2020-10-08 DIAGNOSIS — E21.3 HYPERPARATHYROIDISM (HCC): Primary | ICD-10-CM

## 2020-10-08 DIAGNOSIS — E83.52 HYPERCALCEMIA: ICD-10-CM

## 2020-10-08 DIAGNOSIS — E21.0 PRIMARY HYPERPARATHYROIDISM (HCC): ICD-10-CM

## 2020-10-09 NOTE — TELEPHONE ENCOUNTER
Where did he go to get labs? Only part of them was done , BMP is not done and hence I did not get the complete results directly.     Only when pt called I went into his chart to pull labs - notify this to him    Continue the calcium     Need BMP along with PTH in 4 weeks

## 2020-10-09 NOTE — TELEPHONE ENCOUNTER
Pt states he went to a [facility]. Explained BMP was not done. aske dpt to have drawn along with PTH in 4 weeks. Pt is asking that physician call him and discuss dosing of calcium w/ Vit D as he has been out for a few weeks and he is unsure why he still needs to take it. Explained message will be passed to physician. Order placed for pt,  per Verbal Order with read back from Dr Abhi Seymour 10/9/2020 order mailed 10/09/2020.

## 2020-10-22 LAB
BUN SERPL-MCNC: 14 MG/DL (ref 8–27)
BUN/CREAT SERPL: 15 (ref 10–24)
CALCIUM SERPL-MCNC: 9.6 MG/DL (ref 8.6–10.2)
CHLORIDE SERPL-SCNC: 99 MMOL/L (ref 96–106)
CO2 SERPL-SCNC: 26 MMOL/L (ref 20–29)
CREAT SERPL-MCNC: 0.93 MG/DL (ref 0.76–1.27)
GLUCOSE SERPL-MCNC: 184 MG/DL (ref 65–99)
POTASSIUM SERPL-SCNC: 5.3 MMOL/L (ref 3.5–5.2)
PTH-INTACT SERPL-MCNC: 19 PG/ML (ref 15–65)
SODIUM SERPL-SCNC: 138 MMOL/L (ref 134–144)

## 2020-10-22 NOTE — TELEPHONE ENCOUNTER
Calcium and parathyroid levels have improved  He can discontinue calcium, if he notices tingling and numbness around the mouth and in the fingers resume calcium 500 mg 1 tablet.

## 2020-10-26 ENCOUNTER — TELEPHONE (OUTPATIENT)
Dept: ENDOCRINOLOGY | Age: 75
End: 2020-10-26

## 2020-10-26 NOTE — TELEPHONE ENCOUNTER
Per Dr. Benita Salvador, informed pt of result note, as noted above. Pt verbalized understanding and wanted to know when he is due to come back. Provided pt with upcoming lab and f/u appt. No further questions or concerns at this time.

## 2020-10-26 NOTE — TELEPHONE ENCOUNTER
----- Message from Blair Wheeler MD sent at 10/22/2020  9:40 AM EDT -----  Calcium and parathyroid levels have improved  He can discontinue calcium, if he notices tingling and numbness around the mouth and in the fingers resume calcium 500 mg 1 tablet.

## 2020-11-19 ENCOUNTER — OFFICE VISIT (OUTPATIENT)
Dept: ENDOCRINOLOGY | Age: 75
End: 2020-11-19
Payer: MEDICARE

## 2020-11-19 VITALS
WEIGHT: 123 LBS | TEMPERATURE: 96.9 F | OXYGEN SATURATION: 99 % | RESPIRATION RATE: 16 BRPM | BODY MASS INDEX: 21 KG/M2 | DIASTOLIC BLOOD PRESSURE: 55 MMHG | HEIGHT: 64 IN | HEART RATE: 64 BPM | SYSTOLIC BLOOD PRESSURE: 135 MMHG

## 2020-11-19 DIAGNOSIS — E78.2 MIXED HYPERLIPIDEMIA: ICD-10-CM

## 2020-11-19 DIAGNOSIS — I10 ESSENTIAL HYPERTENSION: ICD-10-CM

## 2020-11-19 DIAGNOSIS — E11.65 TYPE 2 DIABETES MELLITUS WITH HYPERGLYCEMIA, WITHOUT LONG-TERM CURRENT USE OF INSULIN (HCC): Primary | ICD-10-CM

## 2020-11-19 DIAGNOSIS — E21.3 HYPERPARATHYROIDISM (HCC): ICD-10-CM

## 2020-11-19 PROCEDURE — 83036 HEMOGLOBIN GLYCOSYLATED A1C: CPT | Performed by: INTERNAL MEDICINE

## 2020-11-19 PROCEDURE — G8536 NO DOC ELDER MAL SCRN: HCPCS | Performed by: INTERNAL MEDICINE

## 2020-11-19 PROCEDURE — G8754 DIAS BP LESS 90: HCPCS | Performed by: INTERNAL MEDICINE

## 2020-11-19 PROCEDURE — 3052F HG A1C>EQUAL 8.0%<EQUAL 9.0%: CPT | Performed by: INTERNAL MEDICINE

## 2020-11-19 PROCEDURE — G8420 CALC BMI NORM PARAMETERS: HCPCS | Performed by: INTERNAL MEDICINE

## 2020-11-19 PROCEDURE — G8432 DEP SCR NOT DOC, RNG: HCPCS | Performed by: INTERNAL MEDICINE

## 2020-11-19 PROCEDURE — 99214 OFFICE O/P EST MOD 30 MIN: CPT | Performed by: INTERNAL MEDICINE

## 2020-11-19 PROCEDURE — G8427 DOCREV CUR MEDS BY ELIG CLIN: HCPCS | Performed by: INTERNAL MEDICINE

## 2020-11-19 PROCEDURE — 1101F PT FALLS ASSESS-DOCD LE1/YR: CPT | Performed by: INTERNAL MEDICINE

## 2020-11-19 PROCEDURE — G8752 SYS BP LESS 140: HCPCS | Performed by: INTERNAL MEDICINE

## 2020-11-19 RX ORDER — PIOGLITAZONEHYDROCHLORIDE 30 MG/1
30 TABLET ORAL DAILY
Qty: 90 TAB | Refills: 4 | Status: SHIPPED | OUTPATIENT
Start: 2020-11-19 | End: 2020-12-11 | Stop reason: SDUPTHER

## 2020-11-19 NOTE — LETTER
11/20/20 Patient: Joon Kline YOB: 1945 Date of Visit: 11/19/2020 Natalie Kapoor MD 
[address] [facility] [address] VIA Facsimile: [phone] Dear Natalie Kapoor MD, Thank you for referring Mr. Bisi Burt to [id number] [place] [address] for evaluation. My notes for this consultation are attached. If you have questions, please do not hesitate to call me. I look forward to following your patient along with you. Sincerely, Abraham Pérez MD

## 2020-11-19 NOTE — PROGRESS NOTES
Denita Leo MD        Patient Information  Date:11/20/2020  Name : Leslie Combs 67 y.o.     YOB: 1945         Referred by: Leo Brown MD         Chief Complaint   Patient presents with    Diabetes    Thyroid Problem       History of Present Illness: Leslie Combs is a 67 y.o. male here for follow-up of  Type 2 Diabetes Mellitus. He has long-standing history of type 2 diabetes mellitus, at least 20 years ago, on oral medications. He was on insulin before which he has discontinued as he did not like. He is checking fasting blood glucose only, which are controlled,  A1c is 9, suggestive of postprandial hyperglycemia  On repaglinide, 2 episodes of hypoglycemia before lunch, has not noticed any recently  According to wife he is eating more sweets      He also has hypercalcemia with calcium more than 11, PTH nonsuppressed, DEXA scan showed osteopenia, status post parathyroidectomy, 2 gland by   On calcium supplements  No hypocalcemic symptoms          Wt Readings from Last 3 Encounters:   11/19/20 123 lb (55.8 kg)   07/22/20 121 lb (54.9 kg)   06/26/20 119 lb (54 kg)       BP Readings from Last 3 Encounters:   11/19/20 (!) 135/55   07/22/20 142/64   06/26/20 165/74           Past Medical History:   Diagnosis Date    Arthritis     FINGERS, NECK-OSTEO    DM (diabetes mellitus) (HCC)     GERD (gastroesophageal reflux disease)     HTN (hypertension)     Hypercalcemia     Hyperlipidemia     PUD (peptic ulcer disease)     Thyroid disease     Vitamin D deficiency      Current Outpatient Medications   Medication Sig    pioglitazone (ACTOS) 30 mg tablet Take 1 Tab by mouth daily.  repaglinide (PRANDIN) 1 mg tablet TAKE 1 TABLET THREE TIMES DAILY    metoprolol succinate (TOPROL-XL) 25 mg XL tablet Take 12.5 mg by mouth nightly.  glucose blood VI test strips (Accu-Chek Angelica Plus test strp) strip Use to check BG daily. Dx code E11.65    Blood-Glucose Meter (Accu-Chek Angelica Plus Meter) misc Use to check BG daily. Dx code E11.65    lancets (Accu-Chek Softclix Lancets) misc Use to check BG daily. Dx code E11.65    Blood Glucose Control High&Low (Accu-Chek Angelica Control Soln) soln Use as directed. Dx code E11.65    alcohol swabs padm Use to check BG daily. Dx code E11.65    losartan (COZAAR) 100 mg tablet Take 50 mg by mouth every morning.  metFORMIN ER (GLUCOPHAGE XR) 500 mg tablet Take 1,000 mg by mouth two (2) times a day.  atorvastatin (LIPITOR) 10 mg tablet Take  by mouth every morning.  amLODIPine (NORVASC) 5 mg tablet Take 10 mg by mouth nightly.  SITagliptin (JANUVIA) 100 mg tablet Take 100 mg by mouth nightly. Daily before dinner    calcium carbonate (CALCIUM 500 PO) Take  by mouth. No current facility-administered medications for this visit. Allergies   Allergen Reactions    Aspirin Nausea Only       Review of Systems:  All systems reviewed and are negative other than mentioned in HPI    Physical Examination:   Blood pressure (!) 135/55, pulse 64, temperature 96.9 °F (36.1 °C), temperature source Oral, resp. rate 16, height 5' 4\" (1.626 m), weight 123 lb (55.8 kg), SpO2 99 %. Estimated body mass index is 21.11 kg/m² as calculated from the following:    Height as of this encounter: 5' 4\" (1.626 m). -   Weight as of this encounter: 123 lb (55.8 kg).   - General: pleasant, no distress, good eye contact  - HEENT: no pallor, no periorbital edema, EOMI  - Neck: supple,  - Cardiovascular: regular,  normal S1 and S2,  - Respiratory: clear to auscultation bilaterally  - Gastrointestinal: soft, nontender, nondistended,  BS +  - Musculoskeletal: no proximal muscle weakness in upper or lower extremities  - Integumentary: No edema  - Neurological: alert and oriented  - Psychiatric: normal mood and affect  - Skin: color, texture, turgor normal.     Diabetic foot exam: June 2019    Left Foot:   Visual Exam: normal    Pulse DP: 2+ (normal)   Filament test: reduced sensation          Right Foot:   Visual Exam: normal    Pulse DP: 2+ (normal)   Filament test: reduced sensation        Data Reviewed:         DEXA scan May 2018  Lumbar spine T score -1.2  Femoral neck T score -2.3  Right femoral neck T score -2.3  FRAX score major osteoporotic fractures is 5%, hip fracture 1.9%  No vertebral fractures reported    Assessment/Plan:     1. Type 2 diabetes mellitus with hyperglycemia, without long-term current use of insulin ([facility]    2. Essential hypertension    3. Mixed hyperlipidemia    4. Hyperparathyroidism ([facility]        1. Type 2 Diabetes Mellitus   Lab Results   Component Value Date/Time    Hemoglobin A1c (POC) 9 02/19/2020 09:34 AM    Hemoglobin A1c, External 9.0 11/13/2019   A1c November 2020 - 9  Uncontrolled  Low pancreatic reserve,  Continue metformin, trial of Actos, Prandin  Not ready for insulin         FLU annually ,Pneumovax ,aspirin daily,annual eye exam,microalbumin    2. HTN : Continue current therapy     3. Hyperlipidemia : Continue statin. 5.  Hypercalcemia: Primary hyperparathyroidism   24-hour urine calcium 270  PTH 40 with calcium more than 11  Osteopenia on DEXA  Status post 2 gland parathyroidectomy  Calcium normal    Subcentimeter thyroid nodule on ultrasound    There are no Patient Instructions on file for this visit. Follow-up and Dispositions    · Return in about 4 months (around 3/19/2021). Thank you for allowing me to participate in the care of this patient. Jessy Ramirez MD      Patient verbalized understanding     Voice-recognition software was used to generate this report, which may result in some phonetic-based errors in the grammar and contents. Even though attempts were made to correct all the mistakes, some may have been missed and remained in the body of the report.

## 2020-11-19 NOTE — PROGRESS NOTES
Job Dennison is a 67 y.o. male here for   Chief Complaint   Patient presents with    Diabetes    Thyroid Problem       1. Have you been to the ER, urgent care clinic since your last visit? Hospitalized since your last visit? -no    2. Have you seen or consulted any other health care providers outside of the [address] [place] [address] since your last visit? Include any pap smears or colon screening. -PCP

## 2020-11-20 LAB — HBA1C MFR BLD HPLC: 9 %

## 2020-12-10 DIAGNOSIS — E11.65 TYPE 2 DIABETES MELLITUS WITH HYPERGLYCEMIA, WITHOUT LONG-TERM CURRENT USE OF INSULIN (HCC): Primary | ICD-10-CM

## 2020-12-10 NOTE — TELEPHONE ENCOUNTER
Patient requesting for Actos to be sent to [facility] mail order pharmacy he states it was not asked of him where to send

## 2020-12-11 RX ORDER — PIOGLITAZONEHYDROCHLORIDE 30 MG/1
30 TABLET ORAL DAILY
Qty: 90 TAB | Refills: 4 | Status: SHIPPED | OUTPATIENT
Start: 2020-12-11 | End: 2021-08-23

## 2021-01-26 LAB
MICROALBUMIN UR TEST STR-MCNC: 3.3 MG/DL
PSA, EXTERNAL: 3.1

## 2021-01-28 DIAGNOSIS — E11.65 TYPE 2 DIABETES MELLITUS WITH HYPERGLYCEMIA, WITHOUT LONG-TERM CURRENT USE OF INSULIN (HCC): ICD-10-CM

## 2021-01-28 RX ORDER — ISOPROPYL ALCOHOL 70 ML/100ML
SWAB TOPICAL
Qty: 100 PAD | Refills: 5 | Status: SHIPPED | OUTPATIENT
Start: 2021-01-28

## 2021-01-28 RX ORDER — BLOOD SUGAR DIAGNOSTIC
STRIP MISCELLANEOUS
Qty: 100 STRIP | Refills: 3 | Status: SHIPPED | OUTPATIENT
Start: 2021-01-28

## 2021-01-28 RX ORDER — LANCETS
EACH MISCELLANEOUS
Qty: 100 EACH | Refills: 3 | Status: SHIPPED | OUTPATIENT
Start: 2021-01-28

## 2021-03-24 ENCOUNTER — OFFICE VISIT (OUTPATIENT)
Dept: ENDOCRINOLOGY | Age: 76
End: 2021-03-24
Payer: MEDICARE

## 2021-03-24 VITALS
WEIGHT: 126 LBS | HEART RATE: 65 BPM | TEMPERATURE: 95.9 F | RESPIRATION RATE: 16 BRPM | HEIGHT: 64 IN | DIASTOLIC BLOOD PRESSURE: 57 MMHG | BODY MASS INDEX: 21.51 KG/M2 | OXYGEN SATURATION: 99 % | SYSTOLIC BLOOD PRESSURE: 139 MMHG

## 2021-03-24 DIAGNOSIS — I10 ESSENTIAL HYPERTENSION: ICD-10-CM

## 2021-03-24 DIAGNOSIS — E78.2 MIXED HYPERLIPIDEMIA: ICD-10-CM

## 2021-03-24 DIAGNOSIS — E11.65 TYPE 2 DIABETES MELLITUS WITH HYPERGLYCEMIA, WITHOUT LONG-TERM CURRENT USE OF INSULIN (HCC): Primary | ICD-10-CM

## 2021-03-24 PROCEDURE — 1101F PT FALLS ASSESS-DOCD LE1/YR: CPT | Performed by: INTERNAL MEDICINE

## 2021-03-24 PROCEDURE — G8752 SYS BP LESS 140: HCPCS | Performed by: INTERNAL MEDICINE

## 2021-03-24 PROCEDURE — G8510 SCR DEP NEG, NO PLAN REQD: HCPCS | Performed by: INTERNAL MEDICINE

## 2021-03-24 PROCEDURE — 99214 OFFICE O/P EST MOD 30 MIN: CPT | Performed by: INTERNAL MEDICINE

## 2021-03-24 PROCEDURE — G8420 CALC BMI NORM PARAMETERS: HCPCS | Performed by: INTERNAL MEDICINE

## 2021-03-24 PROCEDURE — G8427 DOCREV CUR MEDS BY ELIG CLIN: HCPCS | Performed by: INTERNAL MEDICINE

## 2021-03-24 PROCEDURE — G8754 DIAS BP LESS 90: HCPCS | Performed by: INTERNAL MEDICINE

## 2021-03-24 PROCEDURE — G8536 NO DOC ELDER MAL SCRN: HCPCS | Performed by: INTERNAL MEDICINE

## 2021-03-24 RX ORDER — SEMAGLUTIDE 1.34 MG/ML
INJECTION, SOLUTION SUBCUTANEOUS
Qty: 1 BOX | Refills: 0 | Status: SHIPPED | COMMUNITY
Start: 2021-03-24 | End: 2021-11-11

## 2021-03-24 NOTE — PROGRESS NOTES
David Galdamez MD        Patient Information  Date:3/24/2021  Name : Ewa Kwong 67 y.o.     YOB: 1945         Referred by: Bhavna Palomares MD         Chief Complaint   Patient presents with    Diabetes    Thyroid Problem       History of Present Illness: Ewa Kwong is a 67 y.o. male here for follow-up of  Type 2 Diabetes Mellitus. He has long-standing history of type 2 diabetes mellitus, at least 20 years ago, on oral medications. He was on insulin before which he has discontinued as he did not like.   He has not been checking the blood glucose, no meter  Last A1c from PCPs office was 10.9 in January 2021  Prior to that it was 9  Diet is healthy, he exercises as much as he can  Refused insulin in the past    Prior history  He also has hypercalcemia with calcium more than 11, PTH nonsuppressed, DEXA scan showed osteopenia, status post parathyroidectomy, 2 gland by   On calcium supplements  No hypocalcemic symptoms          Wt Readings from Last 3 Encounters:   03/24/21 126 lb (57.2 kg)   11/19/20 123 lb (55.8 kg)   07/22/20 121 lb (54.9 kg)       BP Readings from Last 3 Encounters:   03/24/21 (!) 139/57   11/19/20 (!) 135/55   07/22/20 142/64           Past Medical History:   Diagnosis Date    Arthritis     FINGERS, NECK-OSTEO    DM (diabetes mellitus) ([facility]     GERD (gastroesophageal reflux disease)     HTN (hypertension)     Hypercalcemia     Hyperlipidemia     PUD (peptic ulcer disease)     Thyroid disease     Vitamin D deficiency      Current Outpatient Medications   Medication Sig    semaglutide (Ozempic) 0.25 mg/0.2 mL (2 mg/1.5 mL) sub-q pen 0.25 mg weekly for one month, 0.5 mg weekly then 1 mg weekly Dispense as sample per Dr Monty Durand    glucose blood VI test strips (Accu-Chek Angelica Plus test strp) strip TEST BLOOD SUGAR ONE TIME DAILY    alcohol swabs (BD Single Use Swabs Regular) padm USE AS DIRECTED ONE TIME DAILY    lancets (Accu-Chek Softclix Lancets) misc TEST BLOOD SUGAR ONE TIME DAILY    pioglitazone (ACTOS) 30 mg tablet Take 1 Tab by mouth daily.  repaglinide (PRANDIN) 1 mg tablet TAKE 1 TABLET THREE TIMES DAILY    metoprolol succinate (TOPROL-XL) 25 mg XL tablet Take 12.5 mg by mouth nightly.  Blood-Glucose Meter (Accu-Chek Angelica Plus Meter) misc Use to check BG daily. Dx code E11.65    Blood Glucose Control High&Low (Accu-Chek Angelica Control Soln) soln Use as directed. Dx code E11.65    losartan (COZAAR) 100 mg tablet Take 50 mg by mouth every morning.  metFORMIN ER (GLUCOPHAGE XR) 500 mg tablet Take 1,000 mg by mouth two (2) times a day.  atorvastatin (LIPITOR) 10 mg tablet Take  by mouth every morning.  amLODIPine (NORVASC) 5 mg tablet Take 10 mg by mouth nightly.  calcium carbonate (CALCIUM 500 PO) Take  by mouth. No current facility-administered medications for this visit. Allergies   Allergen Reactions    Aspirin Nausea Only       Review of Systems:  All systems reviewed and are negative other than mentioned in HPI    Physical Examination:   Blood pressure (!) 139/57, pulse 65, temperature (!) 95.9 °F (35.5 °C), temperature source Oral, resp. rate 16, height 5' 4\" (1.626 m), weight 126 lb (57.2 kg), SpO2 99 %. Estimated body mass index is 21.63 kg/m² as calculated from the following:    Height as of this encounter: 5' 4\" (1.626 m). -   Weight as of this encounter: 126 lb (57.2 kg).   - General: pleasant, no distress, good eye contact  - HEENT: no exophthalmos, no periorbital edema, EOMI  - Neck: No visible thyromegaly  - RS: Normal respiratory effort  - Musculoskeletal: no tremors  - Neurological: alert and oriented  - Psychiatric: normal mood and affect  - Skin: Normal color    Diabetic foot exam: June 2019    Left Foot:   Visual Exam: normal    Pulse DP: 2+ (normal)   Filament test: reduced sensation          Right Foot:   Visual Exam: normal    Pulse DP: 2+ (normal)   Filament test: reduced sensation        Data Reviewed:         DEXA scan May 2018  Lumbar spine T score -1.2  Femoral neck T score -2.3  Right femoral neck T score -2.3  FRAX score major osteoporotic fractures is 5%, hip fracture 1.9%  No vertebral fractures reported    Assessment/Plan:     1. Type 2 diabetes mellitus with hyperglycemia, without long-term current use of insulin ([address]    2. Essential hypertension    3. Mixed hyperlipidemia        1. Type 2 Diabetes Mellitus   Lab Results   Component Value Date/Time    Hemoglobin A1c (POC) 9.0 11/19/2020 08:45 AM    Hemoglobin A1c, External 9.1 02/28/2020   Worsening hyperglycemia, discussed the risk of renal failure, CVD, A1c 10.9  Refusing insulin  He has very low pancreatic reserve, he needs insulin, BMI is low, discussed with him that lifestyle changes alone and will be a challenge to get good glycemic control  Discussed avoid long-term complications  Metformin, Actos, Prandin  Not ready for insulin still  Ozempic sample given, if no improvement in a month discussed about starting insulin which he is reluctant. Brand medications are expensive also         FLU annually ,Pneumovax ,aspirin daily,annual eye exam,microalbumin    2. HTN : Continue current therapy     3. Hyperlipidemia : Continue statin. 5.  Hypercalcemia: Primary hyperparathyroidism   24-hour urine calcium 270  PTH 40 with calcium more than 11  Osteopenia on DEXA  Status post 2 gland parathyroidectomy  Calcium normal    Subcentimeter thyroid nodule on ultrasound        Patient Instructions   OZEMPIC     Inject 0.25 mg weekly for one month, then increase to 0.5 mg weekly. Some of the side effects are nausea, vomiting and in most cases it will eventually resolve. If there is a family history of medullary thyroid cancer you should not be taking this medication.     Once you tolerate 0.5 mg weekly we can increase it to 1 mg weekly if desired weight loss or sugar control is not achieved, you need a different prescription for 1 mg weekly dose    Follow-up and Dispositions    · Return in about 3 months (around 6/24/2021). Thank you for allowing me to participate in the care of this patient. Tavares Desai MD      Patient verbalized understanding     Voice-recognition software was used to generate this report, which may result in some phonetic-based errors in the grammar and contents. Even though attempts were made to correct all the mistakes, some may have been missed and remained in the body of the report.

## 2021-03-24 NOTE — PATIENT INSTRUCTIONS
Alex Chewton Inject 0.25 mg weekly for one month, then increase to 0.5 mg weekly. Some of the side effects are nausea, vomiting and in most cases it will eventually resolve. If there is a family history of medullary thyroid cancer you should not be taking this medication. Once you tolerate 0.5 mg weekly we can increase it to 1 mg weekly if desired weight loss or sugar control is not achieved, you need a different prescription for 1 mg weekly dose

## 2021-03-24 NOTE — PROGRESS NOTES
Dwaine Felty is a 67 y.o. male here for   Chief Complaint   Patient presents with    Diabetes    Thyroid Problem       1. Have you been to the ER, urgent care clinic since your last visit? Hospitalized since your last visit? -no    2. Have you seen or consulted any other health care providers outside of the [address] [place] [address] since your last visit? Include any pap smears or colon screening. -PCP    Has had both COVID vaccines

## 2021-03-24 NOTE — LETTER
3/24/2021 Patient: Kendell Roman YOB: 1945 Date of Visit: 3/24/2021 Harjeet Inman MD 
[address] [facility] [address] Via Fax: [phone] Dear Harjeet Inman MD, Thank you for referring Mr. Jesús Santacruz to [address] [place] [address] for evaluation. My notes for this consultation are attached. If you have questions, please do not hesitate to call me. I look forward to following your patient along with you. Sincerely, Gretchen Nicolas MD

## 2021-05-06 ENCOUNTER — TELEPHONE (OUTPATIENT)
Dept: ENDOCRINOLOGY | Age: 76
End: 2021-05-06

## 2021-05-06 DIAGNOSIS — E11.65 TYPE 2 DIABETES MELLITUS WITH HYPERGLYCEMIA, WITHOUT LONG-TERM CURRENT USE OF INSULIN (HCC): Primary | ICD-10-CM

## 2021-05-06 NOTE — TELEPHONE ENCOUNTER
----- Message from Vitaly Witt sent at 5/6/2021  8:44 AM EDT -----  Regarding: Dr. Cosme Bautista first and last name: self    Reason for call: Low Blood Sugar    Callback required yes/no and why: Yes, to discuss low blood sugar levels    Best contact number(s): [phone]    Details to clarify the request: Pt stated that he has the [facility], and it has been recording low blood sugars in the middle of the night of 64 & 69. Pt would like to discuss why this is happening.

## 2021-05-06 NOTE — TELEPHONE ENCOUNTER
Repaglinide among all the medications he is on can cause low blood sugars, we can discontinue repaglinide  Continue Metformin, Actos, start Ozempic after discontinuing repaglinide  Ozempic does not drop the sugars too low. May cause nausea little bit but eventually will go away.

## 2021-05-06 NOTE — TELEPHONE ENCOUNTER
Pt stated the last couple of nights his BG was low. Asked pt if he double checked readings with a fingerstick as sometimes the Chen can read lower. He did not. He did double check it during the day one time and the Chen read higher than the fingerstick. Advised pt to still always double check with a fingerstick. No new meds. Taking Metformin, repaglinide and pioglitazone as prescribed. Never started the Ozempic. No illnesses. Pt provided following readings but will also come by to have Keisha.     5/3/21:  Before Bfast - 155  After Bfast - 104  Before Lunch - 117  After Lunch -162  Before Dinner - 129  After Dinner - 166    5/4/21:  Before Bfast - 120  After Bfast - 109  Before Lunch - 137  After Lunch -136  Before Dinner - 126  After Dinner - 104  Night - 69    5/5/21:  Before Bfast - 114  After Bfast - 104  Before Lunch - 83  After Lunch -146  Before Dinner - 96  After Dinner - 125  11pm - 69 1am - 69

## 2021-05-07 RX ORDER — SEMAGLUTIDE 1.34 MG/ML
INJECTION, SOLUTION SUBCUTANEOUS
Qty: 2 BOX | Refills: 0 | Status: SHIPPED | COMMUNITY
Start: 2021-05-07 | End: 2022-03-10 | Stop reason: ALTCHOICE

## 2021-05-07 RX ORDER — SEMAGLUTIDE 1.34 MG/ML
0.25 INJECTION, SOLUTION SUBCUTANEOUS
Qty: 1 BOX | Refills: 3 | Status: SHIPPED | OUTPATIENT
Start: 2021-05-07 | End: 2021-11-11

## 2021-05-07 NOTE — TELEPHONE ENCOUNTER
Repaglinide is causing  low blood sugars, we can discontinue repaglinide  Continue Metformin, Actos, start Ozempic after discontinuing repaglinide  Ozempic does not drop the sugars too low. May cause nausea little bit but eventually will go away.       Option 2 if Ozempic causes side effects - is Metformin , Actos     Repaglinide 1 tab before breakfast ,luncg and 1/2 tablet before dinner

## 2021-05-07 NOTE — TELEPHONE ENCOUNTER
Informed pt of Dr. Inessa Ponce note. Pt verbalized understanding and stated he does not have the Ozempic. Asked if he can come by to get a sample and see how it works. Informed him he can. no further questions or concerns at this time.

## 2021-05-07 NOTE — TELEPHONE ENCOUNTER
Pt came by for sample. Provided teaching and directions to pt. Pt verbalized understanding.     Order placed for pt per verbal order with read back from Dr. Dl Silva 05/07/21

## 2021-07-24 LAB
CREATININE, EXTERNAL: 0.91
HBA1C MFR BLD HPLC: 12.3 %
LDL-C, EXTERNAL: 67

## 2021-07-26 ENCOUNTER — OFFICE VISIT (OUTPATIENT)
Dept: ENDOCRINOLOGY | Age: 76
End: 2021-07-26
Payer: MEDICARE

## 2021-07-26 VITALS
HEIGHT: 64 IN | RESPIRATION RATE: 20 BRPM | DIASTOLIC BLOOD PRESSURE: 59 MMHG | SYSTOLIC BLOOD PRESSURE: 143 MMHG | TEMPERATURE: 97.3 F | BODY MASS INDEX: 20.66 KG/M2 | HEART RATE: 61 BPM | OXYGEN SATURATION: 98 % | WEIGHT: 121 LBS

## 2021-07-26 DIAGNOSIS — E11.65 TYPE 2 DIABETES MELLITUS WITH HYPERGLYCEMIA, WITHOUT LONG-TERM CURRENT USE OF INSULIN (HCC): Primary | ICD-10-CM

## 2021-07-26 DIAGNOSIS — I10 ESSENTIAL HYPERTENSION: ICD-10-CM

## 2021-07-26 PROCEDURE — 99215 OFFICE O/P EST HI 40 MIN: CPT | Performed by: INTERNAL MEDICINE

## 2021-07-26 PROCEDURE — G8427 DOCREV CUR MEDS BY ELIG CLIN: HCPCS | Performed by: INTERNAL MEDICINE

## 2021-07-26 PROCEDURE — G8754 DIAS BP LESS 90: HCPCS | Performed by: INTERNAL MEDICINE

## 2021-07-26 PROCEDURE — G8753 SYS BP > OR = 140: HCPCS | Performed by: INTERNAL MEDICINE

## 2021-07-26 PROCEDURE — 1101F PT FALLS ASSESS-DOCD LE1/YR: CPT | Performed by: INTERNAL MEDICINE

## 2021-07-26 PROCEDURE — G8536 NO DOC ELDER MAL SCRN: HCPCS | Performed by: INTERNAL MEDICINE

## 2021-07-26 PROCEDURE — G8432 DEP SCR NOT DOC, RNG: HCPCS | Performed by: INTERNAL MEDICINE

## 2021-07-26 PROCEDURE — G8420 CALC BMI NORM PARAMETERS: HCPCS | Performed by: INTERNAL MEDICINE

## 2021-07-26 RX ORDER — LANOLIN ALCOHOL/MO/W.PET/CERES
5000 CREAM (GRAM) TOPICAL DAILY
COMMUNITY

## 2021-07-26 RX ORDER — LOSARTAN POTASSIUM AND HYDROCHLOROTHIAZIDE 25; 100 MG/1; MG/1
1 TABLET ORAL DAILY
COMMUNITY

## 2021-07-26 NOTE — PROGRESS NOTES
Lavonne Thomas is a 67 y.o. male here for   Chief Complaint   Patient presents with    Diabetes       1. Have you been to the ER, urgent care clinic since your last visit? Hospitalized since your last visit? -no    2. Have you seen or consulted any other health care providers outside of the [address] [place] [address] since your last visit? Include any pap smears or colon screening. -PCP and Dr. Millie Alejandro

## 2021-07-26 NOTE — LETTER
7/29/2021    Patient: Caren Little   YOB: 1945   Date of Visit: 7/26/2021     Marianela Cuello MD  [facility] [phone]  Via Fax: [phone]    Dear Marianela Cuello MD,      Thank you for referring Mr. Salima Cha to [address] [place] [address] for evaluation. My notes for this consultation are attached. If you have questions, please do not hesitate to call me. I look forward to following your patient along with you.       Sincerely,    Rey Nevarez MD

## 2021-07-26 NOTE — PROGRESS NOTES
Krish Marley MD        Patient Information  Date:7/26/2021  Name : Good Hyde 67 y.o.     YOB: 1945         Referred by: Emigdio Turner MD         Chief Complaint   Patient presents with    Diabetes       History of Present Illness: Good Hyde is a 67 y.o. male here for follow-up of  Type 2 Diabetes Mellitus. He has long-standing history of type 2 diabetes mellitus, at least 20 years ago, on oral medications. He was on insulin before which he has discontinued as he did not like. He has not been checking the blood glucose, no meter  Last A1c from PCPs office was 10.9 in January 2021, recent A1c 12  Prior to that it was 9  Diet is healthy, he exercises as much as he can  Refused insulin in the past    Prior history  He also has hypercalcemia with calcium more than 11, PTH nonsuppressed, DEXA scan showed osteopenia, status post parathyroidectomy, 2 gland by   On calcium supplements  No hypocalcemic symptoms          Wt Readings from Last 3 Encounters:   07/26/21 121 lb (54.9 kg)   03/24/21 126 lb (57.2 kg)   11/19/20 123 lb (55.8 kg)       BP Readings from Last 3 Encounters:   07/26/21 (!) 143/59   03/24/21 (!) 139/57   11/19/20 (!) 135/55           Past Medical History:   Diagnosis Date    Arthritis     FINGERS, NECK-OSTEO    DM (diabetes mellitus) ([facility] [address]     GERD (gastroesophageal reflux disease)     HTN (hypertension)     Hypercalcemia     Hyperlipidemia     PUD (peptic ulcer disease)     Thyroid disease     Vitamin D deficiency      Current Outpatient Medications   Medication Sig    cyanocobalamin (Vitamin B-12) 1,000 mcg tablet Take 1,000 mcg by mouth daily.  losartan-hydroCHLOROthiazide (HYZAAR) 100-25 mg per tablet Take 1 Tablet by mouth daily.     glucose blood VI test strips (Accu-Chek Angelica Plus test strp) strip TEST BLOOD SUGAR ONE TIME DAILY    alcohol swabs (BD Single Use Swabs Regular) padm USE AS DIRECTED ONE TIME DAILY    lancets (Accu-Chek Softclix Lancets) misc TEST BLOOD SUGAR ONE TIME DAILY    metoprolol succinate (TOPROL-XL) 25 mg XL tablet Take 25 mg by mouth daily.  Blood-Glucose Meter (Accu-Chek Angelica Plus Meter) misc Use to check BG daily. Dx code E11.65    Blood Glucose Control High&Low (Accu-Chek Angelica Control Soln) soln Use as directed. Dx code E11.65    metFORMIN ER (GLUCOPHAGE XR) 500 mg tablet Take 1,000 mg by mouth two (2) times a day.  atorvastatin (LIPITOR) 10 mg tablet Take  by mouth every morning.  semaglutide (Ozempic) 0.25 mg/0.2 mL (2 mg/1.5 mL) sub-q pen 0.25 mg by SubCUTAneous route every seven (7) days. For 1 month, then increase to 0.5 mg weekly. (Patient not taking: Reported on 7/26/2021)    semaglutide (Ozempic) 0.25 mg/0.2 mL (2 mg/1.5 mL) sub-q pen Dispense as sample per Dr. Marlys Mueller (Patient not taking: Reported on 7/26/2021)    semaglutide (Ozempic) 0.25 mg/0.2 mL (2 mg/1.5 mL) sub-q pen 0.25 mg weekly for one month, 0.5 mg weekly then 1 mg weekly Dispense as sample per Dr Marlys Mueller (Patient not taking: Reported on 7/26/2021)    pioglitazone (ACTOS) 30 mg tablet Take 1 Tab by mouth daily. (Patient not taking: Reported on 7/26/2021)    calcium carbonate (CALCIUM 500 PO) Take  by mouth. (Patient not taking: Reported on 7/26/2021)    repaglinide (PRANDIN) 1 mg tablet TAKE 1 TABLET THREE TIMES DAILY (Patient not taking: Reported on 7/26/2021)    losartan (COZAAR) 100 mg tablet Take 50 mg by mouth every morning. (Patient not taking: Reported on 7/26/2021)    amLODIPine (NORVASC) 5 mg tablet Take 10 mg by mouth nightly. (Patient not taking: Reported on 7/26/2021)     No current facility-administered medications for this visit. Allergies   Allergen Reactions    Aspirin Nausea Only       Review of Systems: Per HPI    Physical Examination:   Blood pressure (!) 143/59, pulse 61, temperature 97.3 °F (36.3 °C), temperature source Temporal, resp.  rate 20, height 5' 4\" (1.626 m), weight 121 lb (54.9 kg), SpO2 98 %. Estimated body mass index is 20.77 kg/m² as calculated from the following:    Height as of this encounter: 5' 4\" (1.626 m). -   Weight as of this encounter: 121 lb (54.9 kg). - General: pleasant, no distress, good eye contact  - HEENT: no exophthalmos, no periorbital edema, EOMI  - Neck: No visible thyromegaly  - RS: Normal respiratory effort  - Musculoskeletal: no tremors  - Neurological: alert and oriented  - Psychiatric: normal mood and affect  - Skin: Normal color    Diabetic foot exam: June 2019    Left Foot:   Visual Exam: normal    Pulse DP: 2+ (normal)   Filament test: reduced sensation          Right Foot:   Visual Exam: normal    Pulse DP: 2+ (normal)   Filament test: reduced sensation      Diabetic Foot and Eye Exam HM Status   Topic Date Due    Eye Exam  Never done    Diabetic Foot Care  07/26/2022         Data Reviewed:         DEXA scan May 2018  Lumbar spine T score -1.2  Femoral neck T score -2.3  Right femoral neck T score -2.3  FRAX score major osteoporotic fractures is 5%, hip fracture 1.9%  No vertebral fractures reported    Assessment/Plan:     No diagnosis found. 1. Type 2 Diabetes Mellitus   Lab Results   Component Value Date/Time    Hemoglobin A1c (POC) 9.0 11/19/2020 08:45 AM    Hemoglobin A1c, External 10.9 01/26/2021 12:00 AM   Worsening hyperglycemia, discussed the risk of renal failure, CVD, A1c 12  Refusing insulin  He has very low pancreatic reserve, he needs insulin, BMI is low, discussed with him that lifestyle changes alone and will be a challenge to get good glycemic control  Discussed avoid long-term complications  Metformin,  Not ready for insulin still  Ozempic sample given, if no improvement in a month discussed about starting insulin which he is reluctant. Brand medications are expensive also    Discussed with Dr. Marlene Salmeron, PCP     FLU annually ,Pneumovax ,aspirin daily,annual eye exam,microalbumin    2.   HTN : Continue current therapy     3. Hyperlipidemia : Continue statin. 5.  Hypercalcemia: Primary hyperparathyroidism   24-hour urine calcium 270  PTH 40 with calcium more than 11  Osteopenia on DEXA  Status post 2 gland parathyroidectomy  Calcium normal    Subcentimeter thyroid nodule on ultrasound    Given sample of freestyle josiane    There are no Patient Instructions on file for this visit. Thank you for allowing me to participate in the care of this patient. Rosales Ch MD      Patient verbalized understanding     Voice-recognition software was used to generate this report, which may result in some phonetic-based errors in the grammar and contents. Even though attempts were made to correct all the mistakes, some may have been missed and remained in the body of the report.

## 2021-07-26 NOTE — PATIENT INSTRUCTIONS
OZEMPIC     Inject 0.25 mg weekly for one month, then increase to 0.5 mg weekly. Some of the side effects are nausea, vomiting and in most cases it will eventually resolve. If there is a family history of medullary thyroid cancer you should not be taking this medication.     Once you tolerate 0.5 mg weekly we can increase it to 1 mg weekly if desired weight loss or sugar control is not achieved, you need a different prescription for 1 mg weekly dose    Metformin     If you cannot control sugars with Ozempic the we have to start insulin

## 2021-08-23 ENCOUNTER — OFFICE VISIT (OUTPATIENT)
Dept: ENDOCRINOLOGY | Age: 76
End: 2021-08-23
Payer: MEDICARE

## 2021-08-23 VITALS
OXYGEN SATURATION: 97 % | BODY MASS INDEX: 20.49 KG/M2 | HEART RATE: 75 BPM | DIASTOLIC BLOOD PRESSURE: 67 MMHG | SYSTOLIC BLOOD PRESSURE: 158 MMHG | RESPIRATION RATE: 18 BRPM | TEMPERATURE: 97.5 F | HEIGHT: 64 IN | WEIGHT: 120 LBS

## 2021-08-23 DIAGNOSIS — I10 ESSENTIAL HYPERTENSION: ICD-10-CM

## 2021-08-23 DIAGNOSIS — E11.65 TYPE 2 DIABETES MELLITUS WITH HYPERGLYCEMIA, UNSPECIFIED WHETHER LONG TERM INSULIN USE (HCC): Primary | ICD-10-CM

## 2021-08-23 PROCEDURE — G8420 CALC BMI NORM PARAMETERS: HCPCS | Performed by: INTERNAL MEDICINE

## 2021-08-23 PROCEDURE — G8536 NO DOC ELDER MAL SCRN: HCPCS | Performed by: INTERNAL MEDICINE

## 2021-08-23 PROCEDURE — G8753 SYS BP > OR = 140: HCPCS | Performed by: INTERNAL MEDICINE

## 2021-08-23 PROCEDURE — G8432 DEP SCR NOT DOC, RNG: HCPCS | Performed by: INTERNAL MEDICINE

## 2021-08-23 PROCEDURE — 99215 OFFICE O/P EST HI 40 MIN: CPT | Performed by: INTERNAL MEDICINE

## 2021-08-23 PROCEDURE — G8427 DOCREV CUR MEDS BY ELIG CLIN: HCPCS | Performed by: INTERNAL MEDICINE

## 2021-08-23 PROCEDURE — G8754 DIAS BP LESS 90: HCPCS | Performed by: INTERNAL MEDICINE

## 2021-08-23 PROCEDURE — 95251 CONT GLUC MNTR ANALYSIS I&R: CPT | Performed by: INTERNAL MEDICINE

## 2021-08-23 PROCEDURE — 1101F PT FALLS ASSESS-DOCD LE1/YR: CPT | Performed by: INTERNAL MEDICINE

## 2021-08-23 NOTE — PROGRESS NOTES
Neela Gonzalez MD        Patient Information  Date:8/23/2021  Name : Cassius James 67 y.o.     YOB: 1945         Referred by: Juni Weaver MD         Chief Complaint   Patient presents with    Diabetes       History of Present Illness: Csasius James is a 67 y.o. male here for follow-up of  Type 2 Diabetes Mellitus. He has long-standing history of type 2 diabetes mellitus, at least 20 years ago, on oral medications. Last A1c from PCPs office was 10.9 in January 2021, A1c 12, July 2021  He has changed the diet drastically, on Ozempic 0.25 mg weekly  Did not want to start insulin last visit  We had given a sample of freestyle josiane 2 sensor, he is also checking the blood glucose              Wt Readings from Last 3 Encounters:   08/23/21 120 lb (54.4 kg)   07/26/21 121 lb (54.9 kg)   03/24/21 126 lb (57.2 kg)       BP Readings from Last 3 Encounters:   08/23/21 (!) 158/67   07/26/21 (!) 143/59   03/24/21 (!) 139/57           Past Medical History:   Diagnosis Date    Arthritis     FINGERS, NECK-OSTEO    DM (diabetes mellitus) ([address]     GERD (gastroesophageal reflux disease)     HTN (hypertension)     Hypercalcemia     Hyperlipidemia     PUD (peptic ulcer disease)     Thyroid disease     Vitamin D deficiency      Current Outpatient Medications   Medication Sig    cyanocobalamin (Vitamin B-12) 1,000 mcg tablet Take 1,000 mcg by mouth daily.  losartan-hydroCHLOROthiazide (HYZAAR) 100-25 mg per tablet Take 1 Tablet by mouth daily.  semaglutide (Ozempic) 0.25 mg/0.2 mL (2 mg/1.5 mL) sub-q pen 0.25 mg by SubCUTAneous route every seven (7) days. For 1 month, then increase to 0.5 mg weekly.     glucose blood VI test strips (Accu-Chek Angelica Plus test strp) strip TEST BLOOD SUGAR ONE TIME DAILY    alcohol swabs (BD Single Use Swabs Regular) padm USE AS DIRECTED ONE TIME DAILY    lancets (Accu-Chek Softclix Lancets) misc TEST BLOOD SUGAR ONE TIME DAILY    metoprolol succinate (TOPROL-XL) 25 mg XL tablet Take 25 mg by mouth daily.  Blood-Glucose Meter (Accu-Chek Angelica Plus Meter) misc Use to check BG daily. Dx code E11.65    Blood Glucose Control High&Low (Accu-Chek Angelica Control Soln) soln Use as directed. Dx code E11.65    metFORMIN ER (GLUCOPHAGE XR) 500 mg tablet Take 1,000 mg by mouth two (2) times a day.  atorvastatin (LIPITOR) 10 mg tablet Take  by mouth every morning.  semaglutide (Ozempic) 0.25 mg/0.2 mL (2 mg/1.5 mL) sub-q pen Dispense as sample per Dr. Carlos Byrnes (Patient not taking: Reported on 7/26/2021)    semaglutide (Ozempic) 0.25 mg/0.2 mL (2 mg/1.5 mL) sub-q pen 0.25 mg weekly for one month, 0.5 mg weekly then 1 mg weekly Dispense as sample per Dr Carlos Byrnes (Patient not taking: Reported on 7/26/2021)    calcium carbonate (CALCIUM 500 PO) Take  by mouth. (Patient not taking: Reported on 7/26/2021)    losartan (COZAAR) 100 mg tablet Take 50 mg by mouth every morning. (Patient not taking: Reported on 7/26/2021)    amLODIPine (NORVASC) 5 mg tablet Take 10 mg by mouth nightly. (Patient not taking: Reported on 7/26/2021)     No current facility-administered medications for this visit. Allergies   Allergen Reactions    Aspirin Nausea Only       Review of Systems: Per HPI    Physical Examination:   Blood pressure (!) 158/67, pulse 75, temperature 97.5 °F (36.4 °C), temperature source Temporal, resp. rate 18, height 5' 4\" (1.626 m), weight 120 lb (54.4 kg), SpO2 97 %. Estimated body mass index is 20.6 kg/m² as calculated from the following:    Height as of this encounter: 5' 4\" (1.626 m). -   Weight as of this encounter: 120 lb (54.4 kg).   - General: pleasant, no distress, good eye contact  - HEENT: no exophthalmos, no periorbital edema, EOMI  - Neck: No visible thyromegaly  - RS: Normal respiratory effort  - Musculoskeletal: no tremors  - Neurological: alert and oriented  - Psychiatric: normal mood and affect  - Skin: Normal color    Diabetic foot exam: August 2021    Left Foot:   Visual Exam: normal    Pulse DP: 2+ (normal)   Filament test: reduced sensation          Right Foot:   Visual Exam: normal    Pulse DP: 2+ (normal)   Filament test: reduced sensation      Diabetic Foot and Eye Exam HM Status   Topic Date Due    Eye Exam  Never done    Diabetic Foot Care  08/23/2022         Data Reviewed:         DEXA scan May 2018  Lumbar spine T score -1.2  Femoral neck T score -2.3  Right femoral neck T score -2.3  FRAX score major osteoporotic fractures is 5%, hip fracture 1.9%  No vertebral fractures reported    Assessment/Plan:     1. Type 2 diabetes mellitus with hyperglycemia, unspecified whether long term insulin use ([facility] [address]    2. Essential hypertension        1. Type 2 Diabetes Mellitus   Lab Results   Component Value Date/Time    Hemoglobin A1c (POC) 9.0 11/19/2020 08:45 AM    Hemoglobin A1c, External 12.3 07/24/2021 12:00 AM   A1c July 2021: 12    He has very low pancreatic reserve, BMI low  Continuous glucose monitor data downloaded for 2 weeks and reviewed with the patient  Noted post breakfast hyperglycemia, rest normal  No severe hypoglycemia    Significant improvement, expect A1c to improve. I am not sure how long he can continue very low carb diet. Metformin,  Not ready for insulin still  Ozempic 0.5 mg weekly  Brand medications are expensive also  Dietitian visit       FLU annually ,Pneumovax ,aspirin daily,annual eye exam,microalbumin    2. HTN : Continue current therapy     3. Hyperlipidemia : Continue statin.     5.  Hypercalcemia: Primary hyperparathyroidism   24-hour urine calcium 270  PTH 40 with calcium more than 11  Osteopenia on DEXA  Status post 2 gland parathyroidectomy  Calcium normal    Subcentimeter thyroid nodule on ultrasound    Spent > 40 minutes on the day of the visit reviewing chart, examining, ordering/reviewing labs, counseling, discussing therapeutics and documentation in the medical record    There are no Patient Instructions on file for this visit. Follow-up and Dispositions    · Return in about 2 months (around 10/23/2021) for labs before next visit and follow up. Thank you for allowing me to participate in the care of this patient. Walter Norwood MD      Patient verbalized understanding     Voice-recognition software was used to generate this report, which may result in some phonetic-based errors in the grammar and contents. Even though attempts were made to correct all the mistakes, some may have been missed and remained in the body of the report.

## 2021-08-23 NOTE — LETTER
8/23/2021    Patient: Le Waite   YOB: 1945   Date of Visit: 8/23/2021     Fatimah Shaw MD  [facility] [place] [address]  Via Fax: [phone]    Dear Fatimah Shaw MD,      Thank you for referring Mr. Tacho Rebollar to [address] [place] [address] for evaluation. My notes for this consultation are attached. If you have questions, please do not hesitate to call me. I look forward to following your patient along with you.       Sincerely,    Tomas Allison MD

## 2021-08-23 NOTE — PROGRESS NOTES
Avtar Teran is a 67 y.o. male here for   Chief Complaint   Patient presents with    Diabetes       1. Have you been to the ER, urgent care clinic since your last visit? Hospitalized since your last visit? -no    2. Have you seen or consulted any other health care providers outside of the [address] [place] [address] since your last visit? Include any pap smears or colon screening. -PCP    Order placed for pt per verbal order with read back from Dr. Maria De Jesus Park 08/23/21

## 2021-11-10 NOTE — PROGRESS NOTES
Michael Spence is a 67 y.o. male here for   Chief Complaint   Patient presents with    Diabetes       1. Have you been to the ER, urgent care clinic since your last visit? Hospitalized since your last visit? -no    2. Have you seen or consulted any other health care providers outside of the [address] [place] [address] since your last visit? Include any pap smears or colon screening. -PCP    Order placed for pt per verbal order with read back from Dr. Ana Levin 11/11/21

## 2021-11-11 ENCOUNTER — OFFICE VISIT (OUTPATIENT)
Dept: ENDOCRINOLOGY | Age: 76
End: 2021-11-11
Payer: MEDICARE

## 2021-11-11 VITALS
DIASTOLIC BLOOD PRESSURE: 70 MMHG | WEIGHT: 120 LBS | RESPIRATION RATE: 18 BRPM | BODY MASS INDEX: 20.49 KG/M2 | HEART RATE: 67 BPM | OXYGEN SATURATION: 100 % | HEIGHT: 64 IN | SYSTOLIC BLOOD PRESSURE: 173 MMHG | TEMPERATURE: 97.5 F

## 2021-11-11 DIAGNOSIS — E11.65 TYPE 2 DIABETES MELLITUS WITH HYPERGLYCEMIA, WITHOUT LONG-TERM CURRENT USE OF INSULIN (HCC): Primary | ICD-10-CM

## 2021-11-11 DIAGNOSIS — I10 ESSENTIAL HYPERTENSION: ICD-10-CM

## 2021-11-11 DIAGNOSIS — E78.2 MIXED HYPERLIPIDEMIA: ICD-10-CM

## 2021-11-11 PROCEDURE — 1101F PT FALLS ASSESS-DOCD LE1/YR: CPT | Performed by: INTERNAL MEDICINE

## 2021-11-11 PROCEDURE — 99214 OFFICE O/P EST MOD 30 MIN: CPT | Performed by: INTERNAL MEDICINE

## 2021-11-11 PROCEDURE — G8420 CALC BMI NORM PARAMETERS: HCPCS | Performed by: INTERNAL MEDICINE

## 2021-11-11 PROCEDURE — G8753 SYS BP > OR = 140: HCPCS | Performed by: INTERNAL MEDICINE

## 2021-11-11 PROCEDURE — G8536 NO DOC ELDER MAL SCRN: HCPCS | Performed by: INTERNAL MEDICINE

## 2021-11-11 PROCEDURE — G8427 DOCREV CUR MEDS BY ELIG CLIN: HCPCS | Performed by: INTERNAL MEDICINE

## 2021-11-11 PROCEDURE — G8432 DEP SCR NOT DOC, RNG: HCPCS | Performed by: INTERNAL MEDICINE

## 2021-11-11 PROCEDURE — G8754 DIAS BP LESS 90: HCPCS | Performed by: INTERNAL MEDICINE

## 2021-11-11 RX ORDER — SEMAGLUTIDE 1.34 MG/ML
0.5 INJECTION, SOLUTION SUBCUTANEOUS
Qty: 1 BOX | Refills: 0 | Status: SHIPPED | COMMUNITY
Start: 2021-11-11 | End: 2022-03-10 | Stop reason: ALTCHOICE

## 2021-11-11 NOTE — LETTER
11/11/2021    Patient: Valarie Green   YOB: 1945   Date of Visit: 11/11/2021     Mahesh Briones MD  [facility] [address] [phone]  Via Fax: [phone]    Dear Mahesh Briones MD,      Thank you for referring Mr. Ronald Elmore to [address] [place] [address] for evaluation. My notes for this consultation are attached. If you have questions, please do not hesitate to call me. I look forward to following your patient along with you.       Sincerely,    Loretta Gruber MD

## 2021-11-11 NOTE — PROGRESS NOTES
Clara Cartagena MD        Patient Information  Date:11/11/2021  Name : Kermit Clark 67 y.o.     YOB: 1945         Referred by: Luis Dorman MD         Chief Complaint   Patient presents with    Diabetes       History of Present Illness: Kermit Clark is a 67 y.o. male here for follow-up of  Type 2 Diabetes Mellitus. He has long-standing history of type 2 diabetes mellitus, at least 20 years ago, on oral medications. Last A1c from PCPs office was 10.9 in January 2021, A1c 12, July 2021  Diet is off, less activity due to the cold weather, checking blood glucose maybe once a week. I do not have any readings. Not interested in insulin  He was checking it consistently when he had freestyle josiane sensor      Wt Readings from Last 3 Encounters:   11/11/21 120 lb (54.4 kg)   08/23/21 120 lb (54.4 kg)   07/26/21 121 lb (54.9 kg)       BP Readings from Last 3 Encounters:   11/11/21 (!) 173/70   08/23/21 (!) 158/67   07/26/21 (!) 143/59           Past Medical History:   Diagnosis Date    Arthritis     FINGERS, NECK-OSTEO    DM (diabetes mellitus) ([facility] [address]     GERD (gastroesophageal reflux disease)     HTN (hypertension)     Hypercalcemia     Hyperlipidemia     PUD (peptic ulcer disease)     Thyroid disease     Vitamin D deficiency      Current Outpatient Medications   Medication Sig    cyanocobalamin (Vitamin B-12) 1,000 mcg tablet Take 1,000 mcg by mouth daily.  losartan-hydroCHLOROthiazide (HYZAAR) 100-25 mg per tablet Take 1 Tablet by mouth daily.  semaglutide (Ozempic) 0.25 mg/0.2 mL (2 mg/1.5 mL) sub-q pen 0.25 mg by SubCUTAneous route every seven (7) days. For 1 month, then increase to 0.5 mg weekly.     glucose blood VI test strips (Accu-Chek Angelica Plus test strp) strip TEST BLOOD SUGAR ONE TIME DAILY    alcohol swabs (BD Single Use Swabs Regular) padm USE AS DIRECTED ONE TIME DAILY    lancets (Accu-Chek Softclix Lancets) misc TEST BLOOD SUGAR ONE TIME DAILY    metoprolol succinate (TOPROL-XL) 25 mg XL tablet Take 25 mg by mouth daily.  Blood-Glucose Meter (Accu-Chek Angelica Plus Meter) misc Use to check BG daily. Dx code E11.65    Blood Glucose Control High&Low (Accu-Chek Angelica Control Soln) soln Use as directed. Dx code E11.65    metFORMIN ER (GLUCOPHAGE XR) 500 mg tablet Take 1,000 mg by mouth two (2) times a day.  atorvastatin (LIPITOR) 10 mg tablet Take  by mouth every morning.  semaglutide (Ozempic) 0.25 mg/0.2 mL (2 mg/1.5 mL) sub-q pen Dispense as sample per Dr. Fredrick Keenan (Patient not taking: Reported on 7/26/2021)    semaglutide (Ozempic) 0.25 mg/0.2 mL (2 mg/1.5 mL) sub-q pen 0.25 mg weekly for one month, 0.5 mg weekly then 1 mg weekly Dispense as sample per Dr Fredrick Keenan (Patient not taking: Reported on 7/26/2021)    calcium carbonate (CALCIUM 500 PO) Take  by mouth. (Patient not taking: Reported on 7/26/2021)    losartan (COZAAR) 100 mg tablet Take 50 mg by mouth every morning. (Patient not taking: Reported on 7/26/2021)    amLODIPine (NORVASC) 5 mg tablet Take 10 mg by mouth nightly. (Patient not taking: Reported on 7/26/2021)     No current facility-administered medications for this visit. Allergies   Allergen Reactions    Aspirin Nausea Only       Review of Systems: Per HPI    Physical Examination:   Blood pressure (!) 173/70, pulse 67, temperature 97.5 °F (36.4 °C), temperature source Temporal, resp. rate 18, height 5' 4\" (1.626 m), weight 120 lb (54.4 kg), SpO2 100 %. Estimated body mass index is 20.6 kg/m² as calculated from the following:    Height as of this encounter: 5' 4\" (1.626 m). -   Weight as of this encounter: 120 lb (54.4 kg).   - General: pleasant, no distress, good eye contact  - HEENT: no exophthalmos, no periorbital edema, EOMI  - Neck: No thyromegaly  - CVS: S1-S2 regular  - RS: Normal respiratory effort  - Musculoskeletal: no tremors  - Neurological: alert and oriented  - Psychiatric: normal mood and affect  - Skin: Normal color    Diabetic foot exam: August 2021    Left Foot:   Visual Exam: normal    Pulse DP: 2+ (normal)   Filament test: reduced sensation          Right Foot:   Visual Exam: normal    Pulse DP: 2+ (normal)   Filament test: reduced sensation      Diabetic Foot and Eye Exam HM Status   Topic Date Due    Eye Exam  Never done    Diabetic Foot Care  11/11/2022         Data Reviewed:         DEXA scan May 2018  Lumbar spine T score -1.2  Femoral neck T score -2.3  Right femoral neck T score -2.3  FRAX score major osteoporotic fractures is 5%, hip fracture 1.9%  No vertebral fractures reported    Assessment/Plan:     1. Type 2 diabetes mellitus with hyperglycemia, without long-term current use of insulin ([facility] [address]    2. Hyperparathyroidism ([facility] [address]    3. Essential hypertension    4. Mixed hyperlipidemia    5. Hypercalcemia        1. Type 2 Diabetes Mellitus   Lab Results   Component Value Date/Time    Hemoglobin A1c (POC) 9.0 11/19/2020 08:45 AM    Hemoglobin A1c, External 12.3 07/24/2021 12:00 AM   Uncontrolled diabetes mellitus, diet is reportedly off,  A1c 9.5 October 2021 GFR normal  He has very low pancreatic reserve, BMI low  Does not want insulin  Ozempic 0.5 mg weekly  Brand medications are expensive also  If there is no improvement next visit agreed to start insulin       FLU annually ,Pneumovax ,aspirin daily,annual eye exam,microalbumin    2. HTN : Continue current therapy     3. Hyperlipidemia : Continue statin.     5.  Hypercalcemia: Primary hyperparathyroidism   24-hour urine calcium 270  PTH 40 with calcium more than 11  Osteopenia on DEXA  Status post 2 gland parathyroidectomy  Calcium normal    Subcentimeter thyroid nodule on ultrasound    Spent > 40 minutes on the day of the visit reviewing chart, examining, ordering/reviewing labs, counseling, discussing therapeutics and documentation in the medical record    There are no Patient Instructions on file for this visit. Thank you for allowing me to participate in the care of this patient. Juani Miller MD      Patient verbalized understanding     Voice-recognition software was used to generate this report, which may result in some phonetic-based errors in the grammar and contents. Even though attempts were made to correct all the mistakes, some may have been missed and remained in the body of the report.

## 2022-03-10 ENCOUNTER — OFFICE VISIT (OUTPATIENT)
Dept: ENDOCRINOLOGY | Age: 77
End: 2022-03-10
Payer: MEDICARE

## 2022-03-10 VITALS
HEART RATE: 83 BPM | BODY MASS INDEX: 19.97 KG/M2 | WEIGHT: 117 LBS | OXYGEN SATURATION: 99 % | RESPIRATION RATE: 20 BRPM | DIASTOLIC BLOOD PRESSURE: 63 MMHG | SYSTOLIC BLOOD PRESSURE: 141 MMHG | HEIGHT: 64 IN | TEMPERATURE: 97.7 F

## 2022-03-10 DIAGNOSIS — I10 ESSENTIAL HYPERTENSION: ICD-10-CM

## 2022-03-10 DIAGNOSIS — E11.65 TYPE 2 DIABETES MELLITUS WITH HYPERGLYCEMIA, WITHOUT LONG-TERM CURRENT USE OF INSULIN (HCC): Primary | ICD-10-CM

## 2022-03-10 DIAGNOSIS — E78.2 MIXED HYPERLIPIDEMIA: ICD-10-CM

## 2022-03-10 PROCEDURE — 99214 OFFICE O/P EST MOD 30 MIN: CPT | Performed by: INTERNAL MEDICINE

## 2022-03-10 PROCEDURE — G8753 SYS BP > OR = 140: HCPCS | Performed by: INTERNAL MEDICINE

## 2022-03-10 PROCEDURE — 1101F PT FALLS ASSESS-DOCD LE1/YR: CPT | Performed by: INTERNAL MEDICINE

## 2022-03-10 PROCEDURE — G8510 SCR DEP NEG, NO PLAN REQD: HCPCS | Performed by: INTERNAL MEDICINE

## 2022-03-10 PROCEDURE — G8420 CALC BMI NORM PARAMETERS: HCPCS | Performed by: INTERNAL MEDICINE

## 2022-03-10 PROCEDURE — G8754 DIAS BP LESS 90: HCPCS | Performed by: INTERNAL MEDICINE

## 2022-03-10 PROCEDURE — G8427 DOCREV CUR MEDS BY ELIG CLIN: HCPCS | Performed by: INTERNAL MEDICINE

## 2022-03-10 PROCEDURE — G8536 NO DOC ELDER MAL SCRN: HCPCS | Performed by: INTERNAL MEDICINE

## 2022-03-10 RX ORDER — SEMAGLUTIDE 1.34 MG/ML
1 INJECTION, SOLUTION SUBCUTANEOUS
Qty: 3 EACH | Refills: 3 | Status: SHIPPED | OUTPATIENT
Start: 2022-03-10

## 2022-03-10 RX ORDER — PIOGLITAZONEHYDROCHLORIDE 15 MG/1
15 TABLET ORAL
Qty: 90 TABLET | Refills: 3 | Status: SHIPPED | OUTPATIENT
Start: 2022-03-10 | End: 2022-09-14 | Stop reason: SDUPTHER

## 2022-03-10 RX ORDER — SEMAGLUTIDE 1.34 MG/ML
INJECTION, SOLUTION SUBCUTANEOUS
Qty: 1 BOX | Refills: 0 | Status: SHIPPED | COMMUNITY
Start: 2022-03-10 | End: 2022-03-10 | Stop reason: ALTCHOICE

## 2022-03-10 NOTE — PROGRESS NOTES
Stephanie Tinajero is a 67 y.o. male here for   Chief Complaint   Patient presents with    Diabetes       1. Have you been to the ER, urgent care clinic since your last visit? Hospitalized since your last visit? -no    2. Have you seen or consulted any other health care providers outside of the [address] [place] [address] since your last visit? Include any pap smears or colon screening. -PCP

## 2022-03-10 NOTE — PROGRESS NOTES
Shaneka Rees MD        Patient Information  Date:3/12/2022  Name : Vernell Reid 67 y.o.     YOB: 1945         Referred by: Chon Martinez MD         Chief Complaint   Patient presents with    Diabetes       History of Present Illness: Vernell Reid is a 67 y.o. male here for follow-up of  Type 2 Diabetes Mellitus. He has long-standing history of type 2 diabetes mellitus, at least 20 years ago, on oral medications. Last A1c from PCPs office was 10.9 in January 2021, A1c 12, July 2021  Diet is off, less activity due to the cold weather, checking blood glucose intermittently, not as recommended  I do not have readings to go by. Not interested in insulin  He was checking it consistently when he had freestyle josiane sensor      Wt Readings from Last 3 Encounters:   03/10/22 117 lb (53.1 kg)   11/11/21 120 lb (54.4 kg)   08/23/21 120 lb (54.4 kg)       BP Readings from Last 3 Encounters:   03/10/22 (!) 141/63   11/11/21 (!) 173/70   08/23/21 (!) 158/67           Past Medical History:   Diagnosis Date    Arthritis     FINGERS, NECK-OSTEO    DM (diabetes mellitus) ([address]     GERD (gastroesophageal reflux disease)     HTN (hypertension)     Hypercalcemia     Hyperlipidemia     PUD (peptic ulcer disease)     Thyroid disease     Vitamin D deficiency      Current Outpatient Medications   Medication Sig    cyanocobalamin (Vitamin B-12) 1,000 mcg tablet Take 1,000 mcg by mouth daily.  losartan-hydroCHLOROthiazide (HYZAAR) 100-25 mg per tablet Take 1 Tablet by mouth daily.  glucose blood VI test strips (Accu-Chek Angelica Plus test strp) strip TEST BLOOD SUGAR ONE TIME DAILY    alcohol swabs (BD Single Use Swabs Regular) padm USE AS DIRECTED ONE TIME DAILY    lancets (Accu-Chek Softclix Lancets) misc TEST BLOOD SUGAR ONE TIME DAILY    metoprolol succinate (TOPROL-XL) 25 mg XL tablet Take 25 mg by mouth daily.     Blood-Glucose Meter (Accu-Chek Angelica Plus Meter) misc Use to check BG daily. Dx code E11.65    Blood Glucose Control High&Low (Accu-Chek Angelica Control Soln) soln Use as directed. Dx code E11.65    metFORMIN ER (GLUCOPHAGE XR) 500 mg tablet Take 1,000 mg by mouth two (2) times a day.  atorvastatin (LIPITOR) 10 mg tablet Take  by mouth every morning.  semaglutide (Ozempic) 1 mg/dose (4 mg/3 mL) pnij 1 mg by SubCUTAneous route every seven (7) days.  pioglitazone (ACTOS) 15 mg tablet Take 1 Tablet by mouth every morning. No current facility-administered medications for this visit. Allergies   Allergen Reactions    Aspirin Nausea Only       Review of Systems: Per HPI    Physical Examination:   Blood pressure (!) 141/63, pulse 83, temperature 97.7 °F (36.5 °C), temperature source Temporal, resp. rate 20, height 5' 4\" (1.626 m), weight 117 lb (53.1 kg), SpO2 99 %. Estimated body mass index is 20.08 kg/m² as calculated from the following:    Height as of this encounter: 5' 4\" (1.626 m). -   Weight as of this encounter: 117 lb (53.1 kg).   - General: pleasant, no distress, good eye contact  - HEENT: no exophthalmos, no periorbital edema, EOMI  - Neck: No thyromegaly  - CVS: S1-S2 regular  - RS: Normal respiratory effort  - Musculoskeletal: no tremors  - Neurological: alert and oriented  - Psychiatric: normal mood and affect  - Skin: Normal color    Diabetic foot exam: August 2021    Left Foot:   Visual Exam: normal    Pulse DP: 2+ (normal)   Filament test: reduced sensation          Right Foot:   Visual Exam: normal    Pulse DP: 2+ (normal)   Filament test: reduced sensation      Diabetic Foot and Eye Exam HM Status   Topic Date Due    Eye Exam  Never done    Diabetic Foot Care  03/10/2023         Data Reviewed:         DEXA scan May 2018  Lumbar spine T score -1.2  Femoral neck T score -2.3  Right femoral neck T score -2.3  FRAX score major osteoporotic fractures is 5%, hip fracture 1.9%  No vertebral fractures reported    Assessment/Plan:     1. Type 2 diabetes mellitus with hyperglycemia, without long-term current use of insulin ([facility] [address]    2. Essential hypertension        1. Type 2 Diabetes Mellitus   Lab Results   Component Value Date/Time    Hemoglobin A1c (POC) 9.0 11/19/2020 08:45 AM    Hemoglobin A1c, External 12.3 07/24/2021 12:00 AM   Uncontrolled diabetes mellitus, diet is reportedly off,  A1c 9.5 October 2021 GFR normal  He has very low pancreatic reserve, BMI low  Does not want insulin  Ozempic 1 mg weekly, if he cannot tolerate to reduce to 0.5 mg weekly  Brand medications are expensive also  If there is no improvement next visit agreed to start insulin       FLU annually ,Pneumovax ,aspirin daily,annual eye exam,microalbumin    2. HTN : Continue current therapy     3. Hyperlipidemia : Continue statin. 5.  Hypercalcemia: Primary hyperparathyroidism   24-hour urine calcium 270  PTH 40 with calcium more than 11  Osteopenia on DEXA  Status post 2 gland parathyroidectomy  Calcium normal    Subcentimeter thyroid nodule on ultrasound        There are no Patient Instructions on file for this visit. Follow-up and Dispositions    · Return in about 3 months (around 6/10/2022). Thank you for allowing me to participate in the care of this patient. Stoney Todd MD      Patient verbalized understanding     Voice-recognition software was used to generate this report, which may result in some phonetic-based errors in the grammar and contents. Even though attempts were made to correct all the mistakes, some may have been missed and remained in the body of the report.

## 2022-03-19 PROBLEM — E11.65 TYPE 2 DIABETES MELLITUS WITH HYPERGLYCEMIA, WITHOUT LONG-TERM CURRENT USE OF INSULIN (HCC): Status: ACTIVE | Noted: 2018-06-24

## 2022-03-19 PROBLEM — I10 ESSENTIAL HYPERTENSION: Status: ACTIVE | Noted: 2018-06-24

## 2022-03-19 PROBLEM — E78.2 MIXED HYPERLIPIDEMIA: Status: ACTIVE | Noted: 2018-06-24

## 2022-03-19 PROBLEM — E83.52 HYPERCALCEMIA: Status: ACTIVE | Noted: 2018-06-24

## 2022-03-19 PROBLEM — E21.3 HYPERPARATHYROIDISM (HCC): Status: ACTIVE | Noted: 2020-06-26

## 2022-04-26 LAB
CREATININE, EXTERNAL: 1
HBA1C MFR BLD HPLC: 8.8 %

## 2022-06-14 ENCOUNTER — OFFICE VISIT (OUTPATIENT)
Dept: ENDOCRINOLOGY | Age: 77
End: 2022-06-14
Payer: MEDICARE

## 2022-06-14 VITALS
WEIGHT: 116 LBS | RESPIRATION RATE: 18 BRPM | BODY MASS INDEX: 19.81 KG/M2 | DIASTOLIC BLOOD PRESSURE: 63 MMHG | HEART RATE: 86 BPM | HEIGHT: 64 IN | SYSTOLIC BLOOD PRESSURE: 133 MMHG | OXYGEN SATURATION: 97 % | TEMPERATURE: 97.5 F

## 2022-06-14 DIAGNOSIS — E78.2 MIXED HYPERLIPIDEMIA: ICD-10-CM

## 2022-06-14 DIAGNOSIS — E11.65 TYPE 2 DIABETES MELLITUS WITH HYPERGLYCEMIA, UNSPECIFIED WHETHER LONG TERM INSULIN USE (HCC): Primary | ICD-10-CM

## 2022-06-14 PROCEDURE — G8432 DEP SCR NOT DOC, RNG: HCPCS | Performed by: INTERNAL MEDICINE

## 2022-06-14 PROCEDURE — 1101F PT FALLS ASSESS-DOCD LE1/YR: CPT | Performed by: INTERNAL MEDICINE

## 2022-06-14 PROCEDURE — 1123F ACP DISCUSS/DSCN MKR DOCD: CPT | Performed by: INTERNAL MEDICINE

## 2022-06-14 PROCEDURE — G8752 SYS BP LESS 140: HCPCS | Performed by: INTERNAL MEDICINE

## 2022-06-14 PROCEDURE — G8754 DIAS BP LESS 90: HCPCS | Performed by: INTERNAL MEDICINE

## 2022-06-14 PROCEDURE — G8536 NO DOC ELDER MAL SCRN: HCPCS | Performed by: INTERNAL MEDICINE

## 2022-06-14 PROCEDURE — 99214 OFFICE O/P EST MOD 30 MIN: CPT | Performed by: INTERNAL MEDICINE

## 2022-06-14 PROCEDURE — 3046F HEMOGLOBIN A1C LEVEL >9.0%: CPT | Performed by: INTERNAL MEDICINE

## 2022-06-14 PROCEDURE — G8420 CALC BMI NORM PARAMETERS: HCPCS | Performed by: INTERNAL MEDICINE

## 2022-06-14 PROCEDURE — G8427 DOCREV CUR MEDS BY ELIG CLIN: HCPCS | Performed by: INTERNAL MEDICINE

## 2022-06-14 RX ORDER — SEMAGLUTIDE 1.34 MG/ML
INJECTION, SOLUTION SUBCUTANEOUS
Qty: 1 BOX | Refills: 0 | Status: SHIPPED | COMMUNITY
Start: 2022-06-14

## 2022-06-14 NOTE — PROGRESS NOTES
Silvio Byers is a 65 y.o. male here for   Chief Complaint   Patient presents with    Diabetes       1. Have you been to the ER, urgent care clinic since your last visit? Hospitalized since your last visit? -no    2. Have you seen or consulted any other health care providers outside of the [address] [place] [address] since your last visit? Include any pap smears or colon screening. -PCP and eye specialist

## 2022-06-14 NOTE — LETTER
6/14/2022    Patient: Marli Weaver   YOB: 1945   Date of Visit: 6/14/2022     Kathy Mcleod MD  [address] [facility] [address]  Via Fax: [phone]    Dear Kathy Mcleod MD,      Thank you for referring Mr. Sasha Nelson to [address] [place] [address] for evaluation. My notes for this consultation are attached. If you have questions, please do not hesitate to call me. I look forward to following your patient along with you.       Sincerely,    Rohan Baron MD

## 2022-06-14 NOTE — PROGRESS NOTES
Lizz Mcknight MD        Patient Information  Date:6/14/2022  Name : Cindy Salcido 65 y.o.     YOB: 1945         Referred by: Jamey Leahy MD         Chief Complaint   Patient presents with    Diabetes       History of Present Illness: Cindy Salcido is a 65 y.o. male here for follow-up of  Type 2 Diabetes Mellitus. He has long-standing history of type 2 diabetes mellitus, at least 20 years ago, on oral medications. Last A1c from PCPs office was 10.9 in January 2021, A1c 12, July 2021  On Ozempic, depending on samples  Intermittently checking BG     He was checking it consistently when he had freestyle josiane sensor      Wt Readings from Last 3 Encounters:   06/14/22 116 lb (52.6 kg)   03/10/22 117 lb (53.1 kg)   11/11/21 120 lb (54.4 kg)       BP Readings from Last 3 Encounters:   06/14/22 133/63   03/10/22 (!) 141/63   11/11/21 (!) 173/70           Past Medical History:   Diagnosis Date    Arthritis     FINGERS, NECK-OSTEO    DM (diabetes mellitus) (H[facility])     GERD (gastroesophageal reflux disease)     HTN (hypertension)     Hypercalcemia     Hyperlipidemia     PUD (peptic ulcer disease)     Thyroid disease     Vitamin D deficiency      Current Outpatient Medications   Medication Sig    semaglutide (Ozempic) 1 mg/dose (4 mg/3 mL) pnij 1 mg by SubCUTAneous route every seven (7) days.  pioglitazone (ACTOS) 15 mg tablet Take 1 Tablet by mouth every morning.  cyanocobalamin (Vitamin B-12) 1,000 mcg tablet Take 5,000 mcg by mouth daily.  losartan-hydroCHLOROthiazide (HYZAAR) 100-25 mg per tablet Take 1 Tablet by mouth daily.     glucose blood VI test strips (Accu-Chek Angelica Plus test strp) strip TEST BLOOD SUGAR ONE TIME DAILY    alcohol swabs (BD Single Use Swabs Regular) padm USE AS DIRECTED ONE TIME DAILY    lancets (Accu-Chek Softclix Lancets) misc TEST BLOOD SUGAR ONE TIME DAILY    metoprolol succinate (TOPROL-XL) 25 mg XL tablet Take 25 mg by mouth daily.  Blood-Glucose Meter (Accu-Chek Angelica Plus Meter) misc Use to check BG daily. Dx code E11.65    Blood Glucose Control High&Low (Accu-Chek Angelica Control Soln) soln Use as directed. Dx code E11.65    metFORMIN ER (GLUCOPHAGE XR) 500 mg tablet Take 1,000 mg by mouth two (2) times a day.  atorvastatin (LIPITOR) 10 mg tablet Take  by mouth every morning. No current facility-administered medications for this visit. Allergies   Allergen Reactions    Aspirin Nausea Only       Review of Systems: Per HPI    Physical Examination:   Blood pressure 133/63, pulse 86, temperature 97.5 °F (36.4 °C), temperature source Temporal, resp. rate 18, height 5' 4\" (1.626 m), weight 116 lb (52.6 kg), SpO2 97 %. Estimated body mass index is 19.91 kg/m² as calculated from the following:    Height as of this encounter: 5' 4\" (1.626 m). -   Weight as of this encounter: 116 lb (52.6 kg). - General: pleasant, no distress, good eye contact  - HEENT: no exophthalmos, no periorbital edema, EOMI  - Neck: No thyromegaly  - CVS: S1-S2 regular  - RS: Normal respiratory effort  - Musculoskeletal: no tremors  - Neurological: alert and oriented  - Psychiatric: normal mood and affect  - Skin: Normal color    Diabetic foot exam: August 2021    Left Foot:   Visual Exam: normal    Pulse DP: 2+ (normal)   Filament test: reduced sensation          Right Foot:   Visual Exam: normal    Pulse DP: 2+ (normal)   Filament test: reduced sensation      Diabetic Foot and Eye Exam  Status   Topic Date Due    Eye Exam  Never done    Diabetic Foot Care  03/10/2023         Data Reviewed:         DEXA scan May 2018  Lumbar spine T score -1.2  Femoral neck T score -2.3  Right femoral neck T score -2.3  FRAX score major osteoporotic fractures is 5%, hip fracture 1.9%  No vertebral fractures reported    Assessment/Plan:     No diagnosis found.     1. Type 2 Diabetes Mellitus   Lab Results   Component Value Date/Time    Hemoglobin A1c (POC) 9.0 11/19/2020 08:45 AM    Hemoglobin A1c, External 9.0 01/26/2022 12:00 AM   A1c 8.7 May 2022  He has very low pancreatic reserve, BMI low  Refused insulin  Ozempic  0.5 mg weekly: Depending on samples       FLU annually ,Pneumovax ,aspirin daily,annual eye exam,microalbumin    2. HTN : Continue current therapy     3. Hyperlipidemia : Continue statin. 5.  Hypercalcemia: Primary hyperparathyroidism   24-hour urine calcium 270  PTH 40 with calcium more than 11  Osteopenia on DEXA  Status post 2 gland parathyroidectomy  Calcium normal    Subcentimeter thyroid nodule on ultrasound        There are no Patient Instructions on file for this visit. Thank you for allowing me to participate in the care of this patient. Ethel Snow MD      Patient verbalized understanding     Voice-recognition software was used to generate this report, which may result in some phonetic-based errors in the grammar and contents. Even though attempts were made to correct all the mistakes, some may have been missed and remained in the body of the report.

## 2022-08-02 LAB
CREATININE, EXTERNAL: 0.88
HBA1C MFR BLD HPLC: 7.9 %

## 2022-09-14 ENCOUNTER — OFFICE VISIT (OUTPATIENT)
Dept: ENDOCRINOLOGY | Age: 77
End: 2022-09-14
Payer: MEDICARE

## 2022-09-14 VITALS
SYSTOLIC BLOOD PRESSURE: 136 MMHG | TEMPERATURE: 97.6 F | DIASTOLIC BLOOD PRESSURE: 67 MMHG | OXYGEN SATURATION: 98 % | HEIGHT: 64 IN | WEIGHT: 116 LBS | BODY MASS INDEX: 19.81 KG/M2 | HEART RATE: 71 BPM

## 2022-09-14 DIAGNOSIS — E78.2 MIXED HYPERLIPIDEMIA: ICD-10-CM

## 2022-09-14 DIAGNOSIS — I10 ESSENTIAL HYPERTENSION: ICD-10-CM

## 2022-09-14 DIAGNOSIS — E11.65 TYPE 2 DIABETES MELLITUS WITH HYPERGLYCEMIA, UNSPECIFIED WHETHER LONG TERM INSULIN USE (HCC): Primary | ICD-10-CM

## 2022-09-14 DIAGNOSIS — E11.65 TYPE 2 DIABETES MELLITUS WITH HYPERGLYCEMIA, WITHOUT LONG-TERM CURRENT USE OF INSULIN (HCC): ICD-10-CM

## 2022-09-14 LAB — HBA1C MFR BLD HPLC: 7.4 %

## 2022-09-14 PROCEDURE — 1101F PT FALLS ASSESS-DOCD LE1/YR: CPT | Performed by: INTERNAL MEDICINE

## 2022-09-14 PROCEDURE — G8420 CALC BMI NORM PARAMETERS: HCPCS | Performed by: INTERNAL MEDICINE

## 2022-09-14 PROCEDURE — G8752 SYS BP LESS 140: HCPCS | Performed by: INTERNAL MEDICINE

## 2022-09-14 PROCEDURE — 99214 OFFICE O/P EST MOD 30 MIN: CPT | Performed by: INTERNAL MEDICINE

## 2022-09-14 PROCEDURE — G8754 DIAS BP LESS 90: HCPCS | Performed by: INTERNAL MEDICINE

## 2022-09-14 PROCEDURE — 83036 HEMOGLOBIN GLYCOSYLATED A1C: CPT | Performed by: INTERNAL MEDICINE

## 2022-09-14 PROCEDURE — G8510 SCR DEP NEG, NO PLAN REQD: HCPCS | Performed by: INTERNAL MEDICINE

## 2022-09-14 PROCEDURE — G8427 DOCREV CUR MEDS BY ELIG CLIN: HCPCS | Performed by: INTERNAL MEDICINE

## 2022-09-14 PROCEDURE — 3051F HG A1C>EQUAL 7.0%<8.0%: CPT | Performed by: INTERNAL MEDICINE

## 2022-09-14 PROCEDURE — 1123F ACP DISCUSS/DSCN MKR DOCD: CPT | Performed by: INTERNAL MEDICINE

## 2022-09-14 PROCEDURE — G8536 NO DOC ELDER MAL SCRN: HCPCS | Performed by: INTERNAL MEDICINE

## 2022-09-14 RX ORDER — PIOGLITAZONEHYDROCHLORIDE 15 MG/1
15 TABLET ORAL
Qty: 90 TABLET | Refills: 3 | Status: SHIPPED | OUTPATIENT
Start: 2022-09-14

## 2022-09-14 RX ORDER — AMLODIPINE BESYLATE 10 MG/1
TABLET ORAL
COMMUNITY
Start: 2022-07-06

## 2022-09-14 RX ORDER — SEMAGLUTIDE 1.34 MG/ML
0.5 INJECTION, SOLUTION SUBCUTANEOUS
Qty: 1 BOX | Refills: 0 | Status: SHIPPED | COMMUNITY
Start: 2022-09-14

## 2022-09-14 RX ORDER — NIRMATRELVIR AND RITONAVIR 300-100 MG
KIT ORAL
COMMUNITY
Start: 2022-07-21

## 2022-09-14 NOTE — PROGRESS NOTES
1. Have you been to the ER, urgent care clinic since your last visit? Hospitalized since your last visit? No    2. Have you seen or consulted any other health care providers outside of the [address] [place] [address] since your last visit? Include any pap smears or colon screening.  No  Chief Complaint   Patient presents with    Diabetes   Visit Vitals  /67 (BP 1 Location: Left upper arm, BP Patient Position: Sitting, BP Cuff Size: Adult)   Pulse 71   Temp 97.6 °F (36.4 °C) (Temporal)   Ht 5' 4\" (1.626 m)   Wt 116 lb (52.6 kg)   SpO2 98%   BMI 19.91 kg/m²

## 2022-09-14 NOTE — TELEPHONE ENCOUNTER
Requested Prescriptions     Pending Prescriptions Disp Refills    pioglitazone (ACTOS) 15 mg tablet 90 Tablet 3     Sig: Take 1 Tablet by mouth every morning.      Order placed for pt per verbal order with read back from Dr. Tavia Martin 09/14/22

## 2022-09-14 NOTE — LETTER
9/14/2022    Patient: Kermit Clark   YOB: 1945   Date of Visit: 9/14/2022     Malika Yao MD  [address] [facility] [address]  Via Fax: [phone]    Dear Malika Yao MD,      Thank you for referring Mr. Breonna De La Fuente to [address] [place] [address] for evaluation. My notes for this consultation are attached. If you have questions, please do not hesitate to call me. I look forward to following your patient along with you.       Sincerely,    Lucretia Lowry MD

## 2023-04-18 ENCOUNTER — OFFICE VISIT (OUTPATIENT)
Dept: ENDOCRINOLOGY | Age: 78
End: 2023-04-18
Payer: MEDICARE

## 2023-04-18 VITALS
SYSTOLIC BLOOD PRESSURE: 144 MMHG | BODY MASS INDEX: 20.32 KG/M2 | RESPIRATION RATE: 18 BRPM | TEMPERATURE: 97.7 F | OXYGEN SATURATION: 98 % | HEART RATE: 65 BPM | DIASTOLIC BLOOD PRESSURE: 61 MMHG | WEIGHT: 119 LBS | HEIGHT: 64 IN

## 2023-04-18 DIAGNOSIS — I10 ESSENTIAL HYPERTENSION: ICD-10-CM

## 2023-04-18 DIAGNOSIS — E78.2 MIXED HYPERLIPIDEMIA: ICD-10-CM

## 2023-04-18 DIAGNOSIS — E11.65 TYPE 2 DIABETES MELLITUS WITH HYPERGLYCEMIA, WITHOUT LONG-TERM CURRENT USE OF INSULIN (HCC): Primary | ICD-10-CM

## 2023-04-18 LAB — HBA1C MFR BLD HPLC: 8.5 %

## 2023-04-18 PROCEDURE — 3078F DIAST BP <80 MM HG: CPT | Performed by: INTERNAL MEDICINE

## 2023-04-18 PROCEDURE — 83036 HEMOGLOBIN GLYCOSYLATED A1C: CPT | Performed by: INTERNAL MEDICINE

## 2023-04-18 PROCEDURE — 1101F PT FALLS ASSESS-DOCD LE1/YR: CPT | Performed by: INTERNAL MEDICINE

## 2023-04-18 PROCEDURE — 1123F ACP DISCUSS/DSCN MKR DOCD: CPT | Performed by: INTERNAL MEDICINE

## 2023-04-18 PROCEDURE — 99215 OFFICE O/P EST HI 40 MIN: CPT | Performed by: INTERNAL MEDICINE

## 2023-04-18 PROCEDURE — G8420 CALC BMI NORM PARAMETERS: HCPCS | Performed by: INTERNAL MEDICINE

## 2023-04-18 PROCEDURE — G8510 SCR DEP NEG, NO PLAN REQD: HCPCS | Performed by: INTERNAL MEDICINE

## 2023-04-18 PROCEDURE — 3077F SYST BP >= 140 MM HG: CPT | Performed by: INTERNAL MEDICINE

## 2023-04-18 PROCEDURE — 3052F HG A1C>EQUAL 8.0%<EQUAL 9.0%: CPT | Performed by: INTERNAL MEDICINE

## 2023-04-18 PROCEDURE — G8536 NO DOC ELDER MAL SCRN: HCPCS | Performed by: INTERNAL MEDICINE

## 2023-04-18 PROCEDURE — G8427 DOCREV CUR MEDS BY ELIG CLIN: HCPCS | Performed by: INTERNAL MEDICINE

## 2023-04-18 RX ORDER — SEMAGLUTIDE 1.34 MG/ML
INJECTION, SOLUTION SUBCUTANEOUS
Qty: 2 BOX | Refills: 0 | Status: SHIPPED | COMMUNITY
Start: 2023-04-18

## 2023-04-18 RX ORDER — LANCETS
EACH MISCELLANEOUS
Qty: 100 EACH | Refills: 3 | Status: SHIPPED | OUTPATIENT
Start: 2023-04-18

## 2023-04-18 RX ORDER — BLOOD SUGAR DIAGNOSTIC
STRIP MISCELLANEOUS
Qty: 100 STRIP | Refills: 3 | Status: SHIPPED | OUTPATIENT
Start: 2023-04-18

## 2023-04-18 RX ORDER — PIOGLITAZONEHYDROCHLORIDE 15 MG/1
15 TABLET ORAL
Qty: 90 TABLET | Refills: 3 | Status: SHIPPED | OUTPATIENT
Start: 2023-04-18

## 2023-04-18 RX ORDER — ISOPROPYL ALCOHOL 70 ML/100ML
SWAB TOPICAL
Qty: 100 PAD | Refills: 3 | Status: SHIPPED | OUTPATIENT
Start: 2023-04-18

## 2023-04-18 NOTE — PROGRESS NOTES
Starr Ahumada ,MD        Patient Information  Date:4/18/2023  Name : Rayna Davison 65 y.o.     YOB: 1945         Referred by: Ze Shaw MD     Chief Complaint   Patient presents with    Diabetes    Thyroid Problem       History of Present Illness: Rayna Davison is a 65 y.o. male here for follow-up of  Type 2 Diabetes Mellitus. 4/18/23  No BG readings, hasn't been checking  On Ozempic 0.5 mg  Not walking much the last few months  Eating more pancakes   Just returned from [facility]  No severe hypoglycemia    Prior history  He has long-standing history of type 2 diabetes mellitus, at least 20 years ago, on oral medications. Last A1c from PCPs office was 10.9 in January 2021, A1c 12, July 2021  On Ozempic, depending on samples  Checking the blood glucose, blood glucose has improved    Wt Readings from Last 3 Encounters:   04/18/23 119 lb (54 kg)   09/14/22 116 lb (52.6 kg)   06/14/22 116 lb (52.6 kg)       BP Readings from Last 3 Encounters:   04/18/23 (!) 144/61   09/14/22 136/67   06/14/22 133/63       Past Medical History:   Diagnosis Date    Arthritis     FINGERS, NECK-OSTEO    COVID-19 08/2022    DM (diabetes mellitus) (HCC)     GERD (gastroesophageal reflux disease)     HTN (hypertension)     Hypercalcemia     Hyperlipidemia     PUD (peptic ulcer disease)     Thyroid disease     Vitamin D deficiency      Current Outpatient Medications   Medication Sig    amLODIPine (NORVASC) 10 mg tablet     pioglitazone (ACTOS) 15 mg tablet Take 1 Tablet by mouth every morning. semaglutide (Ozempic) 1 mg/dose (4 mg/3 mL) pnij 1 mg by SubCUTAneous route every seven (7) days. (Patient taking differently: 1 mg by SubCUTAneous route every seven (7) days. Pt states he is taking . 5mg weekly)    cyanocobalamin 1,000 mcg tablet Take 5 Tablets by mouth daily. losartan-hydroCHLOROthiazide (HYZAAR) 100-25 mg per tablet Take 1 Tablet by mouth daily. glucose blood VI test strips (Accu-Chek Angelica Plus test strp) strip TEST BLOOD SUGAR ONE TIME DAILY    alcohol swabs (BD Single Use Swabs Regular) padm USE AS DIRECTED ONE TIME DAILY    lancets (Accu-Chek Softclix Lancets) misc TEST BLOOD SUGAR ONE TIME DAILY    metoprolol succinate (TOPROL-XL) 25 mg XL tablet Take 1 Tablet by mouth daily. Blood-Glucose Meter (Accu-Chek Angelica Plus Meter) misc Use to check BG daily. Dx code E11.65    Blood Glucose Control High&Low (Accu-Chek Angelica Control Soln) soln Use as directed. Dx code E11.65    metFORMIN ER (GLUCOPHAGE XR) 500 mg tablet Take 2 Tablets by mouth two (2) times a day. atorvastatin (LIPITOR) 10 mg tablet Take  by mouth every morning. Paxlovid, EUA, 300 mg (150 mg x 2)-100 mg TAKE BY MOUTH AS DIRECTED FOR 5 DAYS (Patient not taking: Reported on 4/18/2023)    semaglutide (Ozempic) 0.25 mg or 0.5 mg/dose (2 mg/1.5 ml) subq pen 0.5 mg by SubCUTAneous route every seven (7) days. Dispense as sample per Dr. Peggy Robert (Patient not taking: Reported on 4/18/2023)    semaglutide (Ozempic) 0.25 mg or 0.5 mg/dose (2 mg/1.5 ml) subq pen Dispense as sample per Dr Estelle Ruth (Patient not taking: Reported on 4/18/2023)     No current facility-administered medications for this visit. Allergies   Allergen Reactions    Aspirin Nausea Only       Review of Systems: Per HPI    Physical Examination:   Blood pressure (!) 144/61, pulse 65, temperature 97.7 °F (36.5 °C), temperature source Temporal, resp. rate 18, height 5' 4\" (1.626 m), weight 119 lb (54 kg), SpO2 98 %. Estimated body mass index is 20.43 kg/m² as calculated from the following:    Height as of this encounter: 5' 4\" (1.626 m). Weight as of this encounter: 119 lb (54 kg).   General: pleasant, no distress, good eye contact  HEENT: no exophthalmos, no periorbital edema, EOMI  Neck: No thyromegaly  CVS: S1-S2 regular  RS: Normal respiratory effort  Musculoskeletal: no tremors  Neurological: alert and oriented  Psychiatric: normal mood and affect  Skin: Normal color    Diabetic foot exam: August 2021    Left Foot:   Visual Exam: normal    Pulse DP: 2+ (normal)   Filament test: reduced sensation          Right Foot:   Visual Exam: normal    Pulse DP: 2+ (normal)   Filament test: reduced sensation      Diabetic Foot and Eye Exam HM Status   Topic Date Due    Eye Exam  Never done    Diabetic Foot Care  09/14/2023       Data Reviewed:       DEXA scan May 2018  Lumbar spine T score -1.2  Femoral neck T score -2.3  Right femoral neck T score -2.3  FRAX score major osteoporotic fractures is 5%, hip fracture 1.9%  No vertebral fractures reported    Assessment/Plan:     1. Type 2 diabetes mellitus with hyperglycemia, without long-term current use of insulin ([facility] [address]    2. Mixed hyperlipidemia    3. Essential hypertension        1. Type 2 Diabetes Mellitus   Lab Results   Component Value Date/Time    Hemoglobin A1c (POC) 8.5 04/18/2023 02:06 PM    Hemoglobin A1c, External 7.9 08/02/2022 12:00 AM   A1c 8.7 May 2022  September 2022: A1c 7.4  A1c April 2023 8.4  He has very low pancreatic reserve, BMI low  Refused insulin  Ozempic  0.5 mg weekly: Depending on samples, pioglitazone, he is trying very hard to keep the blood glucose under control to avoid insulin  If no improvement in the blood glucose in 2 weeks, to increase Ozempic, discussed with patient       FLU annually ,Pneumovax ,aspirin daily,annual eye exam,microalbumin    2. HTN : Continue current therapy     3. Hyperlipidemia : Continue statin. 5.  Hypercalcemia: Primary hyperparathyroidism   24-hour urine calcium 270  PTH 40 with calcium more than 11  Osteopenia on DEXA  Status post 2 gland parathyroidectomy  Calcium normal    Subcentimeter thyroid nodule on ultrasound    He wants to have labs at PCPs office and will get a copy    There are no Patient Instructions on file for this visit.     Spent > 40 minutes on the day of the visit reviewing chart, examining, ordering/reviewing labs, counseling, discussing therapeutics and documentation in the medical record  Thank you for allowing me to participate in the care of this patient. Christine Griffin MD      Patient verbalized understanding     Voice-recognition software was used to generate this report, which may result in some phonetic-based errors in the grammar and contents. Even though attempts were made to correct all the mistakes, some may have been missed and remained in the body of the report.

## 2023-04-18 NOTE — PROGRESS NOTES
Hortencia Schmitt is a 65 y.o. male here for   Chief Complaint   Patient presents with    Diabetes    Thyroid Problem       1. Have you been to the ER or an urgent care clinic since your last visit?  - no    2. Have you been hospitalized since your last visit? - no    3. Have you seen or consulted any other health care providers outside of the [address] [place] [address] since your last visit?   Include any pap smears or colon screening.- PCP

## 2023-05-23 RX ORDER — PIOGLITAZONEHYDROCHLORIDE 15 MG/1
1 TABLET ORAL EVERY MORNING
COMMUNITY
Start: 2023-04-18

## 2023-05-23 RX ORDER — ATORVASTATIN CALCIUM 10 MG/1
TABLET, FILM COATED ORAL
COMMUNITY

## 2023-05-23 RX ORDER — METOPROLOL SUCCINATE 25 MG/1
25 TABLET, EXTENDED RELEASE ORAL DAILY
COMMUNITY

## 2023-05-23 RX ORDER — LOSARTAN POTASSIUM AND HYDROCHLOROTHIAZIDE 25; 100 MG/1; MG/1
1 TABLET ORAL DAILY
COMMUNITY

## 2023-05-23 RX ORDER — AMLODIPINE BESYLATE 10 MG/1
TABLET ORAL
COMMUNITY
Start: 2022-07-06

## 2023-05-23 RX ORDER — METFORMIN HYDROCHLORIDE 500 MG/1
1000 TABLET, EXTENDED RELEASE ORAL 2 TIMES DAILY
COMMUNITY

## 2023-05-23 RX ORDER — SEMAGLUTIDE 1.34 MG/ML
INJECTION, SOLUTION SUBCUTANEOUS
COMMUNITY
Start: 2022-06-14

## 2023-05-23 RX ORDER — SEMAGLUTIDE 1.34 MG/ML
1 INJECTION, SOLUTION SUBCUTANEOUS
COMMUNITY
Start: 2022-03-10

## 2023-09-20 ENCOUNTER — OFFICE VISIT (OUTPATIENT)
Age: 78
End: 2023-09-20
Payer: MEDICARE

## 2023-09-20 VITALS
RESPIRATION RATE: 16 BRPM | WEIGHT: 122.5 LBS | SYSTOLIC BLOOD PRESSURE: 123 MMHG | HEART RATE: 71 BPM | TEMPERATURE: 97.8 F | DIASTOLIC BLOOD PRESSURE: 59 MMHG | HEIGHT: 64 IN | OXYGEN SATURATION: 100 % | BODY MASS INDEX: 20.92 KG/M2

## 2023-09-20 DIAGNOSIS — I10 ESSENTIAL HYPERTENSION: ICD-10-CM

## 2023-09-20 DIAGNOSIS — E78.2 MIXED HYPERLIPIDEMIA: ICD-10-CM

## 2023-09-20 DIAGNOSIS — E11.65 TYPE 2 DIABETES MELLITUS WITH HYPERGLYCEMIA, UNSPECIFIED WHETHER LONG TERM INSULIN USE (HCC): Primary | ICD-10-CM

## 2023-09-20 PROCEDURE — 3078F DIAST BP <80 MM HG: CPT | Performed by: INTERNAL MEDICINE

## 2023-09-20 PROCEDURE — 1036F TOBACCO NON-USER: CPT | Performed by: INTERNAL MEDICINE

## 2023-09-20 PROCEDURE — 1123F ACP DISCUSS/DSCN MKR DOCD: CPT | Performed by: INTERNAL MEDICINE

## 2023-09-20 PROCEDURE — G8420 CALC BMI NORM PARAMETERS: HCPCS | Performed by: INTERNAL MEDICINE

## 2023-09-20 PROCEDURE — 99214 OFFICE O/P EST MOD 30 MIN: CPT | Performed by: INTERNAL MEDICINE

## 2023-09-20 PROCEDURE — G8427 DOCREV CUR MEDS BY ELIG CLIN: HCPCS | Performed by: INTERNAL MEDICINE

## 2023-09-20 PROCEDURE — 3074F SYST BP LT 130 MM HG: CPT | Performed by: INTERNAL MEDICINE

## 2023-09-20 NOTE — PROGRESS NOTES
Angie Cota is a 77 y.o. male here for   Chief Complaint   Patient presents with    Diabetes       1. Have you been to the ER, urgent care clinic since your last visit? Hospitalized since your last visit? - no    2. Have you seen or consulted any other health care providers outside of the [address] [place] [address] Avenue since your last visit?   Include any pap smears or colon screening.- PCP    Hands are shaky

## 2023-09-20 NOTE — PROGRESS NOTES
[facility] ,MD            Patient Information   Date:4/18/2023   Name : Moe Evans 65 y.o.       YOB: 1945           Referred by: Adrienne Norris MD         Chief Complaint   Patient presents with    Diabetes               History of Present Illness: Moe Evans is a 65 y.o. male here  for follow-up of  Type 2 Diabetes Mellitus . He ran out of Ozempic sample, not taking it  Did not bring the meter or the logbook  Was on Ozempic 0.5 mg which he was tolerating  Not able to exercise  Had labs at PCPs office      Prior history   He has long-standing history of type 2 diabetes mellitus, at least 20 years ago, on oral medications. Last A1c from PCPs office was 10.9 in January 2021, A1c 12, July 2021   On Ozempic, depending on samples   Checking the blood glucose, blood glucose has improved          Physical Examination:      General: pleasant, no distress, good eye contact    HEENT: no exophthalmos, no periorbital edema, EOMI    Neck: No thyromegaly    CVS: S1-S2 regular    RS: Normal respiratory effort    Musculoskeletal: no tremors    Neurological: alert and oriented    Psychiatric: normal mood and affect    Skin: Normal color      Diabetic foot exam: August 2021     Left Foot:    Visual Exam: normal     Pulse DP: 2+ (normal)    Filament test: reduced sensation             Right Foot:    Visual Exam: normal     Pulse DP: 2+ (normal)    Filament test: reduced sensation                  Data Reviewed:          DEXA scan May 2018   Lumbar spine T score -1.2   Femoral neck T score -2.3   Right femoral neck T score -2.3   FRAX score major osteoporotic fractures is 5%, hip fracture 1.9%   No vertebral fractures reported      Assessment/Plan:      1.  Type 2 Diabetes Mellitus       9/22023 8.3  A1c 8.7 May 2022  September 2022: A1c 7.4 A1c April 2023 8.4   He has very low pancreatic reserve, BMI low  Declined

## 2023-09-29 DIAGNOSIS — E11.65 TYPE 2 DIABETES MELLITUS WITH HYPERGLYCEMIA, UNSPECIFIED WHETHER LONG TERM INSULIN USE (HCC): Primary | ICD-10-CM

## 2023-09-29 RX ORDER — SEMAGLUTIDE 0.68 MG/ML
0.5 INJECTION, SOLUTION SUBCUTANEOUS WEEKLY
Qty: 9 ML | Refills: 3 | Status: SHIPPED | OUTPATIENT
Start: 2023-09-29

## 2023-11-29 ENCOUNTER — TELEPHONE (OUTPATIENT)
Age: 78
End: 2023-11-29

## 2023-11-29 NOTE — TELEPHONE ENCOUNTER
Pt was prescribed Jardiance and it was making him ill - so he stopped taking it and he is feelling better. Please call patient and advise.

## 2023-11-30 NOTE — TELEPHONE ENCOUNTER
Do not see Kathy Fiddler in med list. Pt stated Dr. Jamie Matson told him he could take Nikkie or Kathy Fiddler and gave him and written script. Took Jardiance 25 mg for about 10 days. After 1-2 days started feeling fatigue/ no energy/ no appetite. Stopped taking it 4 days ago and sxs improved.

## 2024-02-16 LAB — PSA, EXTERNAL: 4.8

## 2024-02-23 LAB
HBA1C MFR BLD HPLC: 9.5 %
MICROALBUMIN/CREATININE RATIO, EXTERNAL: 58

## 2024-03-13 ENCOUNTER — OFFICE VISIT (OUTPATIENT)
Age: 79
End: 2024-03-13
Payer: MEDICARE

## 2024-03-13 VITALS
RESPIRATION RATE: 18 BRPM | BODY MASS INDEX: 20.14 KG/M2 | WEIGHT: 118 LBS | HEIGHT: 64 IN | SYSTOLIC BLOOD PRESSURE: 151 MMHG | HEART RATE: 65 BPM | DIASTOLIC BLOOD PRESSURE: 67 MMHG | TEMPERATURE: 98.7 F

## 2024-03-13 DIAGNOSIS — E11.65 TYPE 2 DIABETES MELLITUS WITH HYPERGLYCEMIA, UNSPECIFIED WHETHER LONG TERM INSULIN USE (HCC): Primary | ICD-10-CM

## 2024-03-13 DIAGNOSIS — I10 ESSENTIAL HYPERTENSION: ICD-10-CM

## 2024-03-13 DIAGNOSIS — E78.2 MIXED HYPERLIPIDEMIA: ICD-10-CM

## 2024-03-13 LAB — HBA1C MFR BLD: 9.2 %

## 2024-03-13 PROCEDURE — 3077F SYST BP >= 140 MM HG: CPT | Performed by: INTERNAL MEDICINE

## 2024-03-13 PROCEDURE — G8484 FLU IMMUNIZE NO ADMIN: HCPCS | Performed by: INTERNAL MEDICINE

## 2024-03-13 PROCEDURE — 99215 OFFICE O/P EST HI 40 MIN: CPT | Performed by: INTERNAL MEDICINE

## 2024-03-13 PROCEDURE — G8420 CALC BMI NORM PARAMETERS: HCPCS | Performed by: INTERNAL MEDICINE

## 2024-03-13 PROCEDURE — 83036 HEMOGLOBIN GLYCOSYLATED A1C: CPT | Performed by: INTERNAL MEDICINE

## 2024-03-13 PROCEDURE — 3078F DIAST BP <80 MM HG: CPT | Performed by: INTERNAL MEDICINE

## 2024-03-13 PROCEDURE — G2211 COMPLEX E/M VISIT ADD ON: HCPCS | Performed by: INTERNAL MEDICINE

## 2024-03-13 PROCEDURE — G8427 DOCREV CUR MEDS BY ELIG CLIN: HCPCS | Performed by: INTERNAL MEDICINE

## 2024-03-13 PROCEDURE — 1123F ACP DISCUSS/DSCN MKR DOCD: CPT | Performed by: INTERNAL MEDICINE

## 2024-03-13 PROCEDURE — 1036F TOBACCO NON-USER: CPT | Performed by: INTERNAL MEDICINE

## 2024-03-13 RX ORDER — TAMSULOSIN HYDROCHLORIDE 0.4 MG/1
0.4 CAPSULE ORAL DAILY
COMMUNITY
Start: 2024-02-22

## 2024-03-13 NOTE — PROGRESS NOTES
[facility] DIABETES AND ENDOCRINOLOGY                 Marisela Rocha MD            Patient Information   Date:4/18/2023   Name : Sebastien Dunlap 77 y.o.       YOB: 1945           Referred by: Mario Kaufman MD         Chief Complaint   Patient presents with    Diabetes               History of Present Illness: Sebastien Dunlap is  here  for follow-up of  Type 2 Diabetes Mellitus .      He has long-standing history of type 2 diabetes mellitus, at least 20 years ago, on oral medications.     Last A1c from PCPs office was 9,  Could not tolerate Jardiance 25 mg, reported decrease in appetite, tried half a tablet and still did not help  Checking blood glucose 1-2 times daily, a week ago blood glucose was close to 200, now the last 1 week it is less than 120 fasting, less than 150 bedtime  Does not want insulin          Physical Examination:      General: pleasant, no distress, good eye contact    HEENT: no exophthalmos, no periorbital edema, EOMI    Neck: No thyromegaly    CVS: S1-S2 regular    RS: Normal respiratory effort    Musculoskeletal: no tremors    Neurological: alert and oriented    Psychiatric: normal mood and affect    Skin: Normal color                  Data Reviewed:          DEXA scan May 2018   Lumbar spine T score -1.2   Femoral neck T score -2.3   Right femoral neck T score -2.3   FRAX score major osteoporotic fractures is 5%, hip fracture 1.9%   No vertebral fractures reported      Assessment/Plan:      1. Type 2 Diabetes Mellitus      Hemoglobin A1C, POC   Date Value Ref Range Status   03/13/2024 9.2 % Final   Uncontrolled   9/22023 8.3  A1c 8.7 May 2022  September 2022: A1c 7.4 A1c April 2023 8.4   He has very low pancreatic reserve, BMI low    Refused insulin again, discussed the complications of renal failure, bleeding in the eye, heart disease  Requesting 3 months of lifestyle changes trial  Metformin, Actos, Ozempic  0.5 mg weekly: He wants to try half a tablet of

## 2024-03-13 NOTE — PROGRESS NOTES
Identified pt with two pt identifiers(name and ). Reviewed record in preparation for visit and have obtained necessary documentation.  Chief Complaint   Patient presents with    Diabetes        Health Maintenance Due   Topic    COVID-19 Vaccine (1)    Pneumococcal 65+ years Vaccine (1 - PCV)    Hepatitis C screen     DTaP/Tdap/Td vaccine (1 - Tdap)    Shingles vaccine (1 of 2)    Respiratory Syncytial Virus (RSV) Pregnant or age 60 yrs+ (1 - 1-dose 60+ series)    Lipids     Flu vaccine (1)    Annual Wellness Visit (Medicare Advantage)        Vitals:    24 1403   BP: (!) 151/67   Site: Right Upper Arm   Position: Sitting   Cuff Size: Small Adult   Pulse: 65   Resp: 18   Temp: 98.7 °F (37.1 °C)   TempSrc: Oral   Weight: 53.5 kg (118 lb)   Height: 1.626 m (5' 4\")        Coordination of Care Questionnaire:  :   1) Have you been to an emergency room, urgent care, or hospitalized since your last visit?  If yes, where when, and reason for visit? No    2. Have seen or consulted any other health care provider since your last visit?   If yes, where when, and reason for visit?  No      3)   Patient is accompanied by self I have received verbal consent from Sebastien Dunlap to discuss any/all medical information while they are present in the room.

## 2024-06-03 ENCOUNTER — OFFICE VISIT (OUTPATIENT)
Age: 79
End: 2024-06-03
Payer: MEDICARE

## 2024-06-03 VITALS
WEIGHT: 122.7 LBS | HEIGHT: 64 IN | SYSTOLIC BLOOD PRESSURE: 172 MMHG | HEART RATE: 73 BPM | BODY MASS INDEX: 20.95 KG/M2 | OXYGEN SATURATION: 100 % | TEMPERATURE: 97.9 F | DIASTOLIC BLOOD PRESSURE: 67 MMHG

## 2024-06-03 DIAGNOSIS — I10 ESSENTIAL HYPERTENSION: ICD-10-CM

## 2024-06-03 DIAGNOSIS — E11.65 TYPE 2 DIABETES MELLITUS WITH HYPERGLYCEMIA, WITHOUT LONG-TERM CURRENT USE OF INSULIN (HCC): Primary | ICD-10-CM

## 2024-06-03 DIAGNOSIS — E78.2 MIXED HYPERLIPIDEMIA: ICD-10-CM

## 2024-06-03 PROCEDURE — 1123F ACP DISCUSS/DSCN MKR DOCD: CPT | Performed by: INTERNAL MEDICINE

## 2024-06-03 PROCEDURE — 99214 OFFICE O/P EST MOD 30 MIN: CPT | Performed by: INTERNAL MEDICINE

## 2024-06-03 PROCEDURE — G8420 CALC BMI NORM PARAMETERS: HCPCS | Performed by: INTERNAL MEDICINE

## 2024-06-03 PROCEDURE — G8427 DOCREV CUR MEDS BY ELIG CLIN: HCPCS | Performed by: INTERNAL MEDICINE

## 2024-06-03 PROCEDURE — 95251 CONT GLUC MNTR ANALYSIS I&R: CPT | Performed by: INTERNAL MEDICINE

## 2024-06-03 PROCEDURE — 3078F DIAST BP <80 MM HG: CPT | Performed by: INTERNAL MEDICINE

## 2024-06-03 PROCEDURE — 3077F SYST BP >= 140 MM HG: CPT | Performed by: INTERNAL MEDICINE

## 2024-06-03 PROCEDURE — 1036F TOBACCO NON-USER: CPT | Performed by: INTERNAL MEDICINE

## 2024-06-03 RX ORDER — INSULIN DEGLUDEC 100 U/ML
8 INJECTION, SOLUTION SUBCUTANEOUS DAILY
Qty: 15 ML | Refills: 3 | Status: SHIPPED | OUTPATIENT
Start: 2024-06-03

## 2024-06-03 RX ORDER — INSULIN ASPART 100 [IU]/ML
3 INJECTION, SOLUTION INTRAVENOUS; SUBCUTANEOUS
Qty: 15 ML | Refills: 3 | Status: SHIPPED | OUTPATIENT
Start: 2024-06-03

## 2024-06-03 RX ORDER — BLOOD-GLUCOSE SENSOR
EACH MISCELLANEOUS
Qty: 7 EACH | Refills: 3 | Status: SHIPPED | OUTPATIENT
Start: 2024-06-03

## 2024-06-03 RX ORDER — IRON POLYSACCHARIDE COMPLEX 150 MG
150 CAPSULE ORAL DAILY
COMMUNITY

## 2024-06-03 RX ORDER — INSULIN DEGLUDEC 100 U/ML
10 INJECTION, SOLUTION SUBCUTANEOUS DAILY
COMMUNITY
End: 2024-06-03 | Stop reason: SDUPTHER

## 2024-06-03 NOTE — PATIENT INSTRUCTIONS
Tresiba slow acting 8 units in AM     Fast acting Humalog or Novolog 3 units before breakfast , 3 units  lunch and 3 units before dinner , if sugars are less than 70 then no fast acting insulin

## 2024-06-03 NOTE — PROGRESS NOTES
[facility] DIABETES AND ENDOCRINOLOGY                 Marisela Rocha MD            Patient Information   Date:4/18/2023   Name : Sebastien Dunlap 77 y.o.       YOB: 1945           Referred by: Mario Kaufman MD         Chief Complaint   Patient presents with    Diabetes    Thyroid Problem               History of Present Illness: Sebastien Dunlap is  here  for follow-up of  Type 2 Diabetes Mellitus .      He has long-standing history of type 2 diabetes mellitus, at least 20 years ago, on oral medications.     Last A1c from PCPs office was 9,  Could not tolerate Jardiance 25 mg, reported decrease in appetite, tried half a tablet and still did not help  Refused insulin in the past  Recent hospitalization, pneumonia, blood glucose was very high, started on Tresiba 10 units  Has freestyle nneka, severe postprandial hyperglycemia, fasting controlled  Since hospitalization increased appetite, eating every 2 hours, eating more carbs per wife  No severe hypoglycemia            Physical Examination:      General: pleasant, no distress, good eye contact    HEENT: no exophthalmos, no periorbital edema, EOMI    Neck: No thyromegaly    CVS: S1-S2 regular    RS: Normal respiratory effort    Musculoskeletal: no tremors    Neurological: alert and oriented    Psychiatric: normal mood and affect    Skin: Normal color                  Data Reviewed:          DEXA scan May 2018   Lumbar spine T score -1.2   Femoral neck T score -2.3   Right femoral neck T score -2.3   FRAX score major osteoporotic fractures is 5%, hip fracture 1.9%   No vertebral fractures reported      Assessment/Plan:      1. Type 2 Diabetes Mellitus      Hemoglobin A1C, POC   Date Value Ref Range Status   03/13/2024 9.2 % Final   Uncontrolled   9/22023 8.3  A1c 8.7 May 2022  September 2022: A1c 7.4 A1c April 2023 8.4   He has very low pancreatic reserve, BMI low  Metformin  Tresiba 8 units  NovoLog/Humalog 3 units before each meal, hold if

## 2024-06-20 ENCOUNTER — TELEPHONE (OUTPATIENT)
Age: 79
End: 2024-06-20

## 2024-06-20 NOTE — TELEPHONE ENCOUNTER
Attempted to call. Unsuccessful. Left ms for Sebastien Dunlap to let her know we do not have any samples at this time. A callback number was left.

## 2024-06-23 DIAGNOSIS — E11.65 TYPE 2 DIABETES MELLITUS WITH HYPERGLYCEMIA, WITHOUT LONG-TERM CURRENT USE OF INSULIN (HCC): Primary | ICD-10-CM

## 2024-06-24 RX ORDER — ISOPROPYL ALCOHOL 70 ML/100ML
SWAB TOPICAL
Qty: 100 EACH | Refills: 3 | Status: SHIPPED | OUTPATIENT
Start: 2024-06-24

## 2024-07-11 ENCOUNTER — OFFICE VISIT (OUTPATIENT)
Age: 79
End: 2024-07-11
Payer: MEDICARE

## 2024-07-11 VITALS
SYSTOLIC BLOOD PRESSURE: 135 MMHG | DIASTOLIC BLOOD PRESSURE: 56 MMHG | BODY MASS INDEX: 21.43 KG/M2 | OXYGEN SATURATION: 98 % | WEIGHT: 125.5 LBS | HEART RATE: 72 BPM | HEIGHT: 64 IN | TEMPERATURE: 98.8 F

## 2024-07-11 DIAGNOSIS — E11.65 TYPE 2 DIABETES MELLITUS WITH HYPERGLYCEMIA, WITHOUT LONG-TERM CURRENT USE OF INSULIN (HCC): Primary | ICD-10-CM

## 2024-07-11 DIAGNOSIS — I10 ESSENTIAL HYPERTENSION: ICD-10-CM

## 2024-07-11 DIAGNOSIS — E78.2 MIXED HYPERLIPIDEMIA: ICD-10-CM

## 2024-07-11 LAB — HBA1C MFR BLD: 8.5 %

## 2024-07-11 PROCEDURE — G8420 CALC BMI NORM PARAMETERS: HCPCS | Performed by: INTERNAL MEDICINE

## 2024-07-11 PROCEDURE — 99214 OFFICE O/P EST MOD 30 MIN: CPT | Performed by: INTERNAL MEDICINE

## 2024-07-11 PROCEDURE — 95251 CONT GLUC MNTR ANALYSIS I&R: CPT | Performed by: INTERNAL MEDICINE

## 2024-07-11 PROCEDURE — 3075F SYST BP GE 130 - 139MM HG: CPT | Performed by: INTERNAL MEDICINE

## 2024-07-11 PROCEDURE — 3078F DIAST BP <80 MM HG: CPT | Performed by: INTERNAL MEDICINE

## 2024-07-11 PROCEDURE — 1123F ACP DISCUSS/DSCN MKR DOCD: CPT | Performed by: INTERNAL MEDICINE

## 2024-07-11 PROCEDURE — 1036F TOBACCO NON-USER: CPT | Performed by: INTERNAL MEDICINE

## 2024-07-11 PROCEDURE — G8427 DOCREV CUR MEDS BY ELIG CLIN: HCPCS | Performed by: INTERNAL MEDICINE

## 2024-07-11 PROCEDURE — 83036 HEMOGLOBIN GLYCOSYLATED A1C: CPT | Performed by: INTERNAL MEDICINE

## 2024-07-11 NOTE — PROGRESS NOTES
[facility] DIABETES AND ENDOCRINOLOGY                 Marisela Rocha MD           Name : Sebastien Dunlap    YOB: 1945           Referred by: Mario Kaufman MD         Chief Complaint   Patient presents with    Diabetes               History of Present Illness: Sebastien Dunlap is  here  for follow-up of  Type 2 Diabetes Mellitus .      He has long-standing history of type 2 diabetes mellitus, at least 20 years ago, on oral medications.    Last A1c 9.2  On MDI  , Forgot to take insulin when he was out of town  Blood glucose was high  Some of the blood glucose were in 300s  No chest pain      Prior history  Could not tolerate Jardiance 25 mg, reported decrease in appetite, tried half a tablet and still did not help  Refused insulin in the past  Recent hospitalization, pneumonia, blood glucose was very high, started on Tresiba 10 units  Has freestyle nneka, severe postprandial hyperglycemia, fasting controlled  Since hospitalization increased appetite, eating every 2 hours, eating more carbs per wife  No severe hypoglycemia            Physical Examination:      General: pleasant, no distress, good eye contact    HEENT: no exophthalmos, no periorbital edema, EOMI    Neck: No thyromegaly    CVS: S1-S2 regular    RS: Normal respiratory effort    Musculoskeletal: no tremors    Neurological: alert and oriented    Psychiatric: normal mood and affect    Skin: Normal color                  Data Reviewed:          DEXA scan May 2018   Lumbar spine T score -1.2   Femoral neck T score -2.3   Right femoral neck T score -2.3   FRAX score major osteoporotic fractures is 5%, hip fracture 1.9%   No vertebral fractures reported      Assessment/Plan:      1. Type 2 Diabetes Mellitus      Hemoglobin A1C, POC   Date Value Ref Range Status   07/11/2024 8.5 % Final      9/22023 8.3  A1c 8.7 May 2022  September 2022: A1c 7.4 A1c April 2023 8.4   He has very low pancreatic reserve, BMI low  Metformin  Tresiba 8

## 2024-07-11 NOTE — PATIENT INSTRUCTIONS
Tresiba slow acting 8 units in AM     Fast acting Humalog or Novolog 3 - 4 units before breakfast , 3 - 4 units  lunch and 3 - 4 units before dinner , if sugars are less than 70 then no fast acting insulin

## 2024-10-10 ENCOUNTER — OFFICE VISIT (OUTPATIENT)
Age: 79
End: 2024-10-10

## 2024-10-10 VITALS
DIASTOLIC BLOOD PRESSURE: 69 MMHG | TEMPERATURE: 98.1 F | WEIGHT: 131.3 LBS | HEIGHT: 64 IN | SYSTOLIC BLOOD PRESSURE: 186 MMHG | BODY MASS INDEX: 22.42 KG/M2 | HEART RATE: 55 BPM | OXYGEN SATURATION: 99 %

## 2024-10-10 DIAGNOSIS — I10 ESSENTIAL HYPERTENSION: ICD-10-CM

## 2024-10-10 DIAGNOSIS — E21.3 HYPERPARATHYROIDISM (HCC): ICD-10-CM

## 2024-10-10 DIAGNOSIS — E83.52 HYPERCALCEMIA: ICD-10-CM

## 2024-10-10 DIAGNOSIS — E11.65 TYPE 2 DIABETES MELLITUS WITH HYPERGLYCEMIA, WITHOUT LONG-TERM CURRENT USE OF INSULIN (HCC): Primary | ICD-10-CM

## 2024-10-10 DIAGNOSIS — E78.2 MIXED HYPERLIPIDEMIA: ICD-10-CM

## 2024-10-10 NOTE — PROGRESS NOTES
[facility] DIABETES AND ENDOCRINOLOGY                 Marisela Rocha MD           Name : Sebastien Dunlap    YOB: 1945           Referred by: Mario Kaufman MD         Chief Complaint   Patient presents with    Diabetes               History of Present Illness: Sebastien Dunlap is  here  for follow-up of  Type 2 Diabetes Mellitus .      He has long-standing history of type 2 diabetes mellitus, at least 20 years ago, on oral medications.    A1c August 2024 8.8 at PCPs office,  On MDI, post breakfast hyperglycemia  He has freestyle nneka        Prior history  Could not tolerate Jardiance 25 mg, reported decrease in appetite, tried half a tablet and still did not help  Refused insulin in the past  Recent hospitalization, pneumonia, blood glucose was very high, started on Tresiba 10 units  Has freestyle nneka, severe postprandial hyperglycemia, fasting controlled  Since hospitalization increased appetite, eating every 2 hours, eating more carbs per wife  No severe hypoglycemia            Physical Examination:      General: pleasant, no distress, good eye contact    HEENT: no exophthalmos, no periorbital edema, EOMI    Neck: No thyromegaly    CVS: S1-S2 regular    RS: Normal respiratory effort    Musculoskeletal: no tremors    Neurological: alert and oriented    Psychiatric: normal mood and affect    Skin: Normal color                  Data Reviewed:          DEXA scan May 2018   Lumbar spine T score -1.2   Femoral neck T score -2.3   Right femoral neck T score -2.3   FRAX score major osteoporotic fractures is 5%, hip fracture 1.9%   No vertebral fractures reported      Assessment/Plan:      1. Type 2 Diabetes Mellitus      Hemoglobin A1C, POC   Date Value Ref Range Status   07/11/2024 8.5 % Final      9/22023 8.3  A1c 8.7 May 2022  September 2022: A1c 7.4 A1c April 2023 8.4   He has very low pancreatic reserve, BMI low  Metformin  Tresiba 8 units  NovoLog/Humalog 3-4 units before each meal,

## 2024-12-04 LAB — HBA1C MFR BLD HPLC: 10.9 %

## 2025-02-17 ENCOUNTER — OFFICE VISIT (OUTPATIENT)
Age: 80
End: 2025-02-17

## 2025-02-17 VITALS
HEART RATE: 86 BPM | DIASTOLIC BLOOD PRESSURE: 48 MMHG | SYSTOLIC BLOOD PRESSURE: 94 MMHG | TEMPERATURE: 97.6 F | OXYGEN SATURATION: 96 % | HEIGHT: 64 IN | WEIGHT: 128.3 LBS | BODY MASS INDEX: 21.91 KG/M2

## 2025-02-17 DIAGNOSIS — E11.65 TYPE 2 DIABETES MELLITUS WITH HYPERGLYCEMIA, WITHOUT LONG-TERM CURRENT USE OF INSULIN (HCC): ICD-10-CM

## 2025-02-17 DIAGNOSIS — E78.2 MIXED HYPERLIPIDEMIA: ICD-10-CM

## 2025-02-17 DIAGNOSIS — I10 ESSENTIAL HYPERTENSION: ICD-10-CM

## 2025-02-17 DIAGNOSIS — E11.65 TYPE 2 DIABETES MELLITUS WITH HYPERGLYCEMIA, WITHOUT LONG-TERM CURRENT USE OF INSULIN (HCC): Primary | ICD-10-CM

## 2025-02-17 LAB — HBA1C MFR BLD: 10.9 %

## 2025-02-17 RX ORDER — ACYCLOVIR 800 MG/1
TABLET ORAL
Qty: 7 EACH | Refills: 3 | Status: SHIPPED | OUTPATIENT
Start: 2025-02-17

## 2025-02-17 RX ORDER — INSULIN DEGLUDEC 100 U/ML
8 INJECTION, SOLUTION SUBCUTANEOUS DAILY
Qty: 15 ML | Refills: 3 | Status: SHIPPED | OUTPATIENT
Start: 2025-02-17

## 2025-02-17 NOTE — PATIENT INSTRUCTIONS
Tresiba slow acting 8 units in AM     Fast acting Humalog or Novolog 3 - 4 units before breakfast , 3 - 4 units  lunch and 3 - 4 units before dinner , if sugars are less than 70 then no fast acting insulin         Additional Humalog  or Novolog with meals if blood sugars are::   201-250 mg 1 units   251-300 mg 2 units   301-350 mg 3 units   351-400 mg 4units   401-450 mg 5 units   451-500 mg 6 units       Example:   My planned insulin dose:    ____ Units for meals    +    ____ Extra Correction Units  if high =  ____ total units to take together as one injection.

## 2025-02-17 NOTE — PROGRESS NOTES
[facility] DIABETES AND ENDOCRINOLOGY                 Marisela Rocha MD           Name : Sebastien Dunlap    YOB: 1945           Referred by: Mario Kaufman MD         The patient (or guardian, if applicable) and other individuals in attendance with the patient were advised that Artificial Intelligence will be utilized during this visit to record, process the conversation to generate a clinical note, and support improvement of the AI technology. The patient (or guardian, if applicable) and other individuals in attendance at the appointment consented to the use of AI, including the recording.    Chief Complaint   Patient presents with    Diabetes               History of Present Illness: Sebastien Dunlap is  here  for follow-up of  Type 2 Diabetes Mellitus .      He has long-standing history of type 2 diabetes mellitus, at least 20 years ago, on oral medications.    A1c August 2024 8.8 at PCPs office,  On MDI,  Used freestyle nneka, currently does not have freestyle nneka  Not checking the blood glucose as recommended, illness for the past 2 weeks, family was sick, decreased appetite, no fever, no cough  Not hydrating well, blood pressure has been running lower  Has an appointment with PCP for the labs in the a.m.  Here with daughter and wife        Prior history  Could not tolerate Jardiance 25 mg, reported decrease in appetite, tried half a tablet and still did not help  Refused insulin in the past  Recent hospitalization, pneumonia, blood glucose was very high, started on Tresiba 10 units  Has freestyle nneka, severe postprandial hyperglycemia, fasting controlled  Since hospitalization increased appetite, eating every 2 hours, eating more carbs per wife  No severe hypoglycemia            Physical Examination:      General: pleasant, no distress, good eye contact    HEENT: no exophthalmos, no periorbital edema, EOMI    Neck: No thyromegaly    CVS: S1-S2 regular    RS: Normal respiratory

## 2025-02-17 NOTE — PROGRESS NOTES
Sebastien Dunlap is a 79 y.o. male here for   Chief Complaint   Patient presents with    Diabetes       1. Have you been to the ER, urgent care clinic since your last visit?  Hospitalized since your last visit? -No    2. Have you seen or consulted any other health care providers outside of the [facility] since your last visit?  Include any pap smears or colon screening.-No

## 2025-02-18 ENCOUNTER — HOSPITAL ENCOUNTER (OUTPATIENT)
Facility: HOSPITAL | Age: 80
Discharge: HOME | End: 2025-02-21
Payer: MEDICARE

## 2025-02-18 ENCOUNTER — TRANSCRIBE ORDERS (OUTPATIENT)
Facility: HOSPITAL | Age: 80
End: 2025-02-18

## 2025-02-18 DIAGNOSIS — A54.5 ACUTE GONOCOCCAL PHARYNGITIS: ICD-10-CM

## 2025-02-18 DIAGNOSIS — R05.8 DRY COUGH: Primary | ICD-10-CM

## 2025-02-18 DIAGNOSIS — R05.8 DRY COUGH: ICD-10-CM

## 2025-02-18 PROCEDURE — 71046 X-RAY EXAM CHEST 2 VIEWS: CPT

## 2025-03-18 ENCOUNTER — TELEPHONE (OUTPATIENT)
Age: 80
End: 2025-03-18

## 2025-03-18 NOTE — TELEPHONE ENCOUNTER
Patient dropped nneka he spoke with   Annalisa who advised he could get a sample given to him. Advised I would have nurse call him back

## 2025-03-19 NOTE — TELEPHONE ENCOUNTER
Called pt and advised him a sample will be at the  for . Pt verbalized understanding with no further questions or concerns at this time.

## 2025-03-25 DIAGNOSIS — E11.65 TYPE 2 DIABETES MELLITUS WITH HYPERGLYCEMIA, WITH LONG-TERM CURRENT USE OF INSULIN (HCC): Primary | ICD-10-CM

## 2025-03-25 DIAGNOSIS — Z79.4 TYPE 2 DIABETES MELLITUS WITH HYPERGLYCEMIA, WITH LONG-TERM CURRENT USE OF INSULIN (HCC): Primary | ICD-10-CM

## 2025-03-25 RX ORDER — HYDROCHLOROTHIAZIDE 12.5 MG/1
CAPSULE ORAL
Qty: 6 EACH | Refills: 3 | Status: SHIPPED | OUTPATIENT
Start: 2025-03-25

## 2025-03-25 NOTE — TELEPHONE ENCOUNTER
Fax received from [facility] asking for PA for Freestyle Brien -  PA already approved 10/01/2024 and ending on 12/31/2025

## 2025-03-31 ENCOUNTER — OFFICE VISIT (OUTPATIENT)
Age: 80
End: 2025-03-31
Payer: MEDICARE

## 2025-03-31 VITALS
OXYGEN SATURATION: 96 % | SYSTOLIC BLOOD PRESSURE: 121 MMHG | HEART RATE: 78 BPM | WEIGHT: 127.5 LBS | DIASTOLIC BLOOD PRESSURE: 57 MMHG | TEMPERATURE: 98.5 F | HEIGHT: 64 IN | BODY MASS INDEX: 21.77 KG/M2

## 2025-03-31 DIAGNOSIS — E83.52 HYPERCALCEMIA: ICD-10-CM

## 2025-03-31 DIAGNOSIS — I10 ESSENTIAL HYPERTENSION: ICD-10-CM

## 2025-03-31 DIAGNOSIS — E11.65 TYPE 2 DIABETES MELLITUS WITH HYPERGLYCEMIA, WITHOUT LONG-TERM CURRENT USE OF INSULIN (HCC): Primary | ICD-10-CM

## 2025-03-31 DIAGNOSIS — E78.2 MIXED HYPERLIPIDEMIA: ICD-10-CM

## 2025-03-31 PROCEDURE — 3074F SYST BP LT 130 MM HG: CPT | Performed by: INTERNAL MEDICINE

## 2025-03-31 PROCEDURE — 99215 OFFICE O/P EST HI 40 MIN: CPT | Performed by: INTERNAL MEDICINE

## 2025-03-31 PROCEDURE — 1036F TOBACCO NON-USER: CPT | Performed by: INTERNAL MEDICINE

## 2025-03-31 PROCEDURE — 1159F MED LIST DOCD IN RCRD: CPT | Performed by: INTERNAL MEDICINE

## 2025-03-31 PROCEDURE — 1160F RVW MEDS BY RX/DR IN RCRD: CPT | Performed by: INTERNAL MEDICINE

## 2025-03-31 PROCEDURE — G2211 COMPLEX E/M VISIT ADD ON: HCPCS | Performed by: INTERNAL MEDICINE

## 2025-03-31 PROCEDURE — 3078F DIAST BP <80 MM HG: CPT | Performed by: INTERNAL MEDICINE

## 2025-03-31 PROCEDURE — 1123F ACP DISCUSS/DSCN MKR DOCD: CPT | Performed by: INTERNAL MEDICINE

## 2025-03-31 PROCEDURE — G8427 DOCREV CUR MEDS BY ELIG CLIN: HCPCS | Performed by: INTERNAL MEDICINE

## 2025-03-31 PROCEDURE — G8420 CALC BMI NORM PARAMETERS: HCPCS | Performed by: INTERNAL MEDICINE

## 2025-03-31 PROCEDURE — 3046F HEMOGLOBIN A1C LEVEL >9.0%: CPT | Performed by: INTERNAL MEDICINE

## 2025-03-31 PROCEDURE — 1126F AMNT PAIN NOTED NONE PRSNT: CPT | Performed by: INTERNAL MEDICINE

## 2025-03-31 RX ORDER — PEN NEEDLE, DIABETIC 31 GX5/16"
NEEDLE, DISPOSABLE MISCELLANEOUS
Qty: 400 EACH | Refills: 3 | Status: SHIPPED | OUTPATIENT
Start: 2025-03-31

## 2025-03-31 RX ORDER — PEN NEEDLE, DIABETIC 31 GX5/16"
1 NEEDLE, DISPOSABLE MISCELLANEOUS 3 TIMES DAILY
COMMUNITY
Start: 2025-03-12 | End: 2025-03-31 | Stop reason: SDUPTHER

## 2025-03-31 NOTE — PROGRESS NOTES
[facility] DIABETES AND ENDOCRINOLOGY                 Marisela Rocha MD           Name : Sebastien Dunlap    YOB: 1945           Referred by: Mario Kaufman MD         The patient (or guardian, if applicable) and other individuals in attendance with the patient were advised that Artificial Intelligence will be utilized during this visit to record, process the conversation to generate a clinical note, and support improvement of the AI technology. The patient (or guardian, if applicable) and other individuals in attendance at the appointment consented to the use of AI, including the recording.    No chief complaint on file.             Sebastien Dunlap is  here  for follow-up of  Type 2 Diabetes Mellitus .   History of Present Illness  The patient presents for evaluation of diabetes mellitus, accompanied by his daughter.    Blood glucose levels improved, no longer reaching 400 or 350. Desires adjustment to alarm settings on FreeStyle device to trigger at 250   No fingerstick checks when blood sugar is low, as no symptoms experienced.    Cross-checking FreeStyle readings with fingerstick tests shows inaccuracies, often reading 90 or 100 when device indicates low.     Current regimen includes 8 units of Tresiba; NovoLog    Appointments with urologist and neurologist scheduled.          Prior history   He has long-standing history of type 2 diabetes mellitus, at least 20 years ago, on oral medications.    A1c August 2024 8.8 at PCPs office,  On MDI,  Used freestyle nneka, currently does not have freestyle nneka  Not checking the blood glucose as recommended, illness for the past 2 weeks, family was sick, decreased appetite, no fever, no cough  Not hydrating well, blood pressure has been running lower  Has an appointment with PCP for the labs in the a.m.  Here with daughter and wife        Prior history  Could not tolerate Jardiance 25 mg, reported decrease in appetite, tried half a tablet and

## 2025-03-31 NOTE — PROGRESS NOTES
Sebastien Dunlap is a 79 y.o. male here for No chief complaint on file.      1. Have you been to the ER, urgent care clinic since your last visit?  Hospitalized since your last visit? - no    2. Have you seen or consulted any other health care providers outside of the [facility] since your last visit?  Include any pap smears or colon screening.-PCP- Follow-up

## 2025-05-01 ENCOUNTER — TELEPHONE (OUTPATIENT)
Age: 80
End: 2025-05-01

## 2025-05-01 DIAGNOSIS — E11.65 TYPE 2 DIABETES MELLITUS WITH HYPERGLYCEMIA, WITH LONG-TERM CURRENT USE OF INSULIN (HCC): ICD-10-CM

## 2025-05-01 DIAGNOSIS — Z79.4 TYPE 2 DIABETES MELLITUS WITH HYPERGLYCEMIA, WITH LONG-TERM CURRENT USE OF INSULIN (HCC): ICD-10-CM

## 2025-05-01 RX ORDER — HYDROCHLOROTHIAZIDE 12.5 MG/1
CAPSULE ORAL
Qty: 6 EACH | Refills: 3 | Status: SHIPPED | OUTPATIENT
Start: 2025-05-01

## 2025-05-21 LAB — HBA1C MFR BLD HPLC: 8 %

## 2025-06-12 ENCOUNTER — TELEPHONE (OUTPATIENT)
Age: 80
End: 2025-06-12

## 2025-06-12 DIAGNOSIS — Z79.4 TYPE 2 DIABETES MELLITUS WITH HYPERGLYCEMIA, WITH LONG-TERM CURRENT USE OF INSULIN (HCC): ICD-10-CM

## 2025-06-12 DIAGNOSIS — E11.65 TYPE 2 DIABETES MELLITUS WITH HYPERGLYCEMIA, WITH LONG-TERM CURRENT USE OF INSULIN (HCC): ICD-10-CM

## 2025-06-12 NOTE — TELEPHONE ENCOUNTER
Patient states [facility] had sent request for sensors but got a letter that they received no response. Please send rx for nneka 3 plus to [facility]

## 2025-06-12 NOTE — TELEPHONE ENCOUNTER
Last month pt wanted Freestyle Brien sent to [facility]. We can't constantly switch.    Attempted to call. Unsuccessful. Left [facility] for Sebastien Dunlap to give us a call back at the office. A callback number was left.

## 2025-06-13 NOTE — TELEPHONE ENCOUNTER
Patient returned call. Patient states he found that he could get the nneka supplies from [facility] much less expensive than using [facility] he was under the impression he had to use [facility]. If there is further issue please advise. He was advised of the below.

## 2025-06-16 RX ORDER — HYDROCHLOROTHIAZIDE 12.5 MG/1
CAPSULE ORAL
Qty: 6 EACH | Refills: 3 | Status: SHIPPED | OUTPATIENT
Start: 2025-06-16

## 2025-07-09 DIAGNOSIS — E11.65 TYPE 2 DIABETES MELLITUS WITH HYPERGLYCEMIA, WITHOUT LONG-TERM CURRENT USE OF INSULIN (HCC): ICD-10-CM

## 2025-07-09 RX ORDER — INSULIN DEGLUDEC 100 U/ML
8 INJECTION, SOLUTION SUBCUTANEOUS DAILY
Qty: 15 ML | Refills: 3 | Status: SHIPPED | OUTPATIENT
Start: 2025-07-09

## 2025-07-31 ENCOUNTER — OFFICE VISIT (OUTPATIENT)
Age: 80
End: 2025-07-31
Payer: MEDICARE

## 2025-07-31 ENCOUNTER — TELEPHONE (OUTPATIENT)
Age: 80
End: 2025-07-31

## 2025-07-31 VITALS
HEIGHT: 64 IN | OXYGEN SATURATION: 97 % | DIASTOLIC BLOOD PRESSURE: 59 MMHG | WEIGHT: 127.1 LBS | TEMPERATURE: 98.1 F | HEART RATE: 72 BPM | BODY MASS INDEX: 21.7 KG/M2 | SYSTOLIC BLOOD PRESSURE: 143 MMHG

## 2025-07-31 DIAGNOSIS — I10 ESSENTIAL HYPERTENSION: ICD-10-CM

## 2025-07-31 DIAGNOSIS — E83.52 HYPERCALCEMIA: ICD-10-CM

## 2025-07-31 DIAGNOSIS — E11.65 TYPE 2 DIABETES MELLITUS WITH HYPERGLYCEMIA, WITHOUT LONG-TERM CURRENT USE OF INSULIN (HCC): Primary | ICD-10-CM

## 2025-07-31 DIAGNOSIS — E78.2 MIXED HYPERLIPIDEMIA: ICD-10-CM

## 2025-07-31 PROCEDURE — 1160F RVW MEDS BY RX/DR IN RCRD: CPT | Performed by: INTERNAL MEDICINE

## 2025-07-31 PROCEDURE — 3077F SYST BP >= 140 MM HG: CPT | Performed by: INTERNAL MEDICINE

## 2025-07-31 PROCEDURE — 3046F HEMOGLOBIN A1C LEVEL >9.0%: CPT | Performed by: INTERNAL MEDICINE

## 2025-07-31 PROCEDURE — 1036F TOBACCO NON-USER: CPT | Performed by: INTERNAL MEDICINE

## 2025-07-31 PROCEDURE — 1159F MED LIST DOCD IN RCRD: CPT | Performed by: INTERNAL MEDICINE

## 2025-07-31 PROCEDURE — G8427 DOCREV CUR MEDS BY ELIG CLIN: HCPCS | Performed by: INTERNAL MEDICINE

## 2025-07-31 PROCEDURE — 1123F ACP DISCUSS/DSCN MKR DOCD: CPT | Performed by: INTERNAL MEDICINE

## 2025-07-31 PROCEDURE — G8420 CALC BMI NORM PARAMETERS: HCPCS | Performed by: INTERNAL MEDICINE

## 2025-07-31 PROCEDURE — 1126F AMNT PAIN NOTED NONE PRSNT: CPT | Performed by: INTERNAL MEDICINE

## 2025-07-31 PROCEDURE — 99215 OFFICE O/P EST HI 40 MIN: CPT | Performed by: INTERNAL MEDICINE

## 2025-07-31 PROCEDURE — 3078F DIAST BP <80 MM HG: CPT | Performed by: INTERNAL MEDICINE

## 2025-07-31 RX ORDER — ISOPROPYL ALCOHOL 70 ML/100ML
SWAB TOPICAL
Qty: 300 EACH | Refills: 3 | Status: SHIPPED | OUTPATIENT
Start: 2025-07-31

## 2025-07-31 RX ORDER — PEN NEEDLE, DIABETIC 30 GX3/16"
NEEDLE, DISPOSABLE MISCELLANEOUS
Qty: 400 EACH | Refills: 3 | Status: SHIPPED | OUTPATIENT
Start: 2025-07-31

## 2025-07-31 NOTE — PROGRESS NOTES
Sebastien Dunlap is a 80 y.o. male here for   Chief Complaint   Patient presents with    Diabetes    Thyroid Problem    Other     Elevated Calcium       1. Have you been to the ER, urgent care clinic since your last visit?  Hospitalized since your last visit? - no    2. Have you seen or consulted any other health care providers outside of the [facility] since your last visit?  Include any pap smears or colon screening.- no

## 2025-07-31 NOTE — PROGRESS NOTES
[facility] DIABETES AND ENDOCRINOLOGY                 Marisela Rocha MD           Name : Sebastien Dunlap    YOB: 1945           Referred by: Mario Kaufman MD         The patient (or guardian, if applicable) and other individuals in attendance with the patient were advised that Artificial Intelligence will be utilized during this visit to record, process the conversation to generate a clinical note, and support improvement of the AI technology. The patient (or guardian, if applicable) and other individuals in attendance at the appointment consented to the use of AI, including the recording.    Chief Complaint   Patient presents with    Diabetes    Thyroid Problem    Other     Elevated Calcium              Sebastien Dunlap is  here  for follow-up of  Type 2 Diabetes Mellitus .   History of Present Illness      The patient presents for evaluation and management of type 2 diabetes mellitus, accompanied by his daughter.    Difficulty in obtaining Freestyle Brien 3 supplies  - Approval currently pending from [facility], He needs continuous glucose monitor as soon as possible to help monitor the blood glucose  When the staff called the pharmacy team at [facility], they were told that they are waiting for the patient's approval for the co-pay    Checking in the morning, no severe hypoglycemia  - Daily readings generally range from 110 to 115 mg/dL  - Recent postprandial reading elevated at 152 mg/dL after consuming three chapatis instead of his usual one    He is on Tresiba and NovoLog        Diet and diabetes management  - Regular diet incorporating protein powder and eggs  - Abstains from sweets  - Interested in obtaining alcohol pads and needles for diabetes management            Prior history  Could not tolerate Jardiance 25 mg, reported decrease in appetite, tried half a tablet and still did not help  Refused insulin in the past  Recent hospitalization, pneumonia, blood glucose was very

## 2025-07-31 NOTE — TELEPHONE ENCOUNTER
Left message on voicemail after returning call.  Dr. Rocha has sent pen needles to [facility].

## (undated) DEVICE — SPONGE: SPECIALTY PEANUT XR 100/CS: Brand: MEDICAL ACTION INDUSTRIES

## (undated) DEVICE — HANDLE LT SNAP ON ULT DURABLE LENS FOR TRUMPF ALC DISPOSABLE

## (undated) DEVICE — SPONGE GZ W4XL4IN COT RADPQ HIGHLY ABSRB

## (undated) DEVICE — PREP SKN CHLRAPRP APL 26ML STR --

## (undated) DEVICE — MAGNETIC INSTR DRAPE 20X16: Brand: MEDLINE INDUSTRIES, INC.

## (undated) DEVICE — HOOK RETRCT L5MM E SHRP SELF RET SYS LONE STAR

## (undated) DEVICE — COVER US PRB W12XL244CM FLD IORT STR TIP

## (undated) DEVICE — PROBE 8225101 5PK STD PRASS FL TIP ROHS

## (undated) DEVICE — AGENT HEMSTAT W2XL3IN OXIDIZED REGENERATED CELOS ABSRB

## (undated) DEVICE — REM POLYHESIVE ADULT PATIENT RETURN ELECTRODE: Brand: VALLEYLAB

## (undated) DEVICE — Z DISCONTINUED GLOVE SURG SZ 7 L12IN FNGR THK13MIL WHT ISOLEX POLYISOPRENE

## (undated) DEVICE — SURGICAL PROCEDURE PACK BASIN MAJ SET CUST NO CAUT

## (undated) DEVICE — MASTISOL ADHESIVE LIQ 2/3ML

## (undated) DEVICE — SHEAR RMFG HARMONIC FOCUS 9CM -- OEM ITEM L#322125

## (undated) DEVICE — ROCKER SWITCH PENCIL BLADE ELECTRODE, HOLSTER: Brand: EDGE

## (undated) DEVICE — STRIP,CLOSURE,WOUND,MEDI-STRIP,1/2X4: Brand: MEDLINE

## (undated) DEVICE — INFECTION CONTROL KIT SYS

## (undated) DEVICE — TOWEL SURG W17XL27IN STD BLU COT NONFENESTRATED PREWASHED

## (undated) DEVICE — GARMENT,MEDLINE,DVT,INT,CALF,MED, GEN2: Brand: MEDLINE

## (undated) DEVICE — PACK,EENT,TURBAN DRAPE,PK II: Brand: MEDLINE

## (undated) DEVICE — SOLUTION IV 1000ML 0.9% SOD CHL

## (undated) DEVICE — EMG TUBE 8229707 NIM TRIVANTAGE 7.0MM ID: Brand: NIM TRIVANTAGE™

## (undated) DEVICE — SUTURE MCRYL SZ 4-0 L27IN ABSRB UD L19MM PS-2 1/2 CIR PRIM Y426H

## (undated) DEVICE — SUTURE VCRL SZ 3-0 L27IN ABSRB UD L26MM SH 1/2 CIR J416H

## (undated) DEVICE — 7 FRENCH DRAIN SYSTEM, STERILE: Brand: TLS

## (undated) DEVICE — INTENDED FOR TISSUE SEPARATION, AND OTHER PROCEDURES THAT REQUIRE A SHARP SURGICAL BLADE TO PUNCTURE OR CUT.: Brand: BARD-PARKER ® CARBON RIB-BACK BLADES

## (undated) DEVICE — INSULATED BLADE ELECTRODE: Brand: EDGE

## (undated) DEVICE — SYR 10ML LUER LOK 1/5ML GRAD --

## (undated) DEVICE — BIPOLAR FORCEPS CORD: Brand: VALLEYLAB